# Patient Record
Sex: FEMALE | Race: WHITE | Employment: FULL TIME | URBAN - METROPOLITAN AREA
[De-identification: names, ages, dates, MRNs, and addresses within clinical notes are randomized per-mention and may not be internally consistent; named-entity substitution may affect disease eponyms.]

---

## 2019-03-24 ENCOUNTER — HOSPITAL ENCOUNTER (EMERGENCY)
Age: 29
Discharge: HOME | End: 2019-03-24
Payer: COMMERCIAL

## 2019-03-24 VITALS
DIASTOLIC BLOOD PRESSURE: 106 MMHG | SYSTOLIC BLOOD PRESSURE: 147 MMHG | BODY MASS INDEX: 43.4 KG/M2 | TEMPERATURE: 98.4 F | HEIGHT: 69 IN | WEIGHT: 293 LBS | HEART RATE: 95 BPM | RESPIRATION RATE: 14 BRPM | OXYGEN SATURATION: 97 %

## 2019-03-24 DIAGNOSIS — T19.2XXA FOREIGN BODY IN VAGINA, INITIAL ENCOUNTER: Primary | ICD-10-CM

## 2019-03-24 PROCEDURE — 99282 EMERGENCY DEPT VISIT SF MDM: CPT

## 2021-11-06 ENCOUNTER — APPOINTMENT (OUTPATIENT)
Dept: CT IMAGING | Age: 31
End: 2021-11-06

## 2021-11-06 ENCOUNTER — APPOINTMENT (OUTPATIENT)
Dept: GENERAL RADIOLOGY | Age: 31
End: 2021-11-06

## 2021-11-06 ENCOUNTER — HOSPITAL ENCOUNTER (EMERGENCY)
Age: 31
Discharge: HOME | End: 2021-11-06
Attending: EMERGENCY MEDICINE

## 2021-11-06 VITALS
DIASTOLIC BLOOD PRESSURE: 92 MMHG | WEIGHT: 260 LBS | HEART RATE: 102 BPM | TEMPERATURE: 98.2 F | OXYGEN SATURATION: 99 % | RESPIRATION RATE: 16 BRPM | BODY MASS INDEX: 37.22 KG/M2 | HEIGHT: 70 IN | SYSTOLIC BLOOD PRESSURE: 139 MMHG

## 2021-11-06 DIAGNOSIS — M79.641 RIGHT HAND PAIN: ICD-10-CM

## 2021-11-06 DIAGNOSIS — M25.531 ACUTE PAIN OF RIGHT WRIST: ICD-10-CM
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
## 2021-11-06 DIAGNOSIS — R51.9 ACUTE FACIAL PAIN: ICD-10-CM

## 2021-11-06 DIAGNOSIS — S63.601A SPRAIN OF RIGHT THUMB, UNSPECIFIED SITE OF DIGIT, INITIAL ENCOUNTER: ICD-10-CM

## 2021-11-06 LAB — HCG(URINE) PREGNANCY TEST: NEGATIVE

## 2021-11-06 PROCEDURE — 84703 CHORIONIC GONADOTROPIN ASSAY: CPT

## 2021-11-06 PROCEDURE — 29125 APPL SHORT ARM SPLINT STATIC: CPT

## 2021-11-06 PROCEDURE — 6370000000 HC RX 637 (ALT 250 FOR IP): Performed by: NURSE PRACTITIONER

## 2021-11-06 PROCEDURE — 99284 EMERGENCY DEPT VISIT MOD MDM: CPT

## 2021-11-06 PROCEDURE — 73130 X-RAY EXAM OF HAND: CPT

## 2021-11-06 PROCEDURE — 73110 X-RAY EXAM OF WRIST: CPT

## 2021-11-06 RX ORDER — HYDROCODONE BITARTRATE AND ACETAMINOPHEN 5; 325 MG/1; MG/1
1 TABLET ORAL EVERY 6 HOURS PRN
Qty: 20 TABLET | Refills: 0 | Status: SHIPPED | OUTPATIENT
Start: 2021-11-06 | End: 2021-11-09

## 2021-11-06 RX ORDER — HYDROCODONE BITARTRATE AND ACETAMINOPHEN 5; 325 MG/1; MG/1
1 TABLET ORAL ONCE
Status: COMPLETED | OUTPATIENT
Start: 2021-11-06 | End: 2021-11-06

## 2021-11-06 RX ORDER — CYCLOBENZAPRINE HCL 10 MG
10 TABLET ORAL 3 TIMES DAILY PRN
Qty: 21 TABLET | Refills: 0 | Status: SHIPPED | OUTPATIENT
Start: 2021-11-06 | End: 2021-11-16

## 2021-11-06 RX ORDER — IBUPROFEN 600 MG/1
600 TABLET ORAL EVERY 6 HOURS PRN
Qty: 30 TABLET | Refills: 0 | Status: SHIPPED | OUTPATIENT
Start: 2021-11-06 | End: 2022-07-22

## 2021-11-06 RX ORDER — IBUPROFEN 800 MG/1
800 TABLET ORAL ONCE
Status: COMPLETED | OUTPATIENT
Start: 2021-11-06 | End: 2021-11-06

## 2021-11-06 RX ADMIN — HYDROCODONE BITARTRATE AND ACETAMINOPHEN 1 TABLET: 5; 325 TABLET ORAL at 22:33

## 2021-11-06 RX ADMIN — MOXIFLOXACIN HYDROCHLORIDE 800 MG: 400 TABLET, FILM COATED ORAL at 22:33

## 2021-11-06 RX ADMIN — HYDROCODONE BITARTRATE AND ACETAMINOPHEN 1 TABLET: 5; 325 TABLET ORAL at 21:27

## 2021-11-06 ASSESSMENT — PAIN DESCRIPTION - ORIENTATION: ORIENTATION: RIGHT

## 2021-11-06 ASSESSMENT — PAIN SCALES - GENERAL
PAINLEVEL_OUTOF10: 7
PAINLEVEL_OUTOF10: 5

## 2021-11-06 ASSESSMENT — PAIN DESCRIPTION - LOCATION: LOCATION: HAND
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
## 2021-11-07 NOTE — ED NOTES
Bed: 28  Expected date:   Expected time:   Means of arrival:   Comments:   [address], RN  11/06/21 2047

## 2021-11-08 ENCOUNTER — TELEPHONE (OUTPATIENT)
Dept: ORTHOPEDIC SURGERY | Age: 31
End: 2021-11-08

## 2022-03-28 ENCOUNTER — HOSPITAL ENCOUNTER (EMERGENCY)
Age: 32
Discharge: HOME | End: 2022-03-28
Attending: EMERGENCY MEDICINE

## 2022-03-28 ENCOUNTER — APPOINTMENT (OUTPATIENT)
Dept: CT IMAGING | Age: 32
End: 2022-03-28

## 2022-03-28 VITALS
OXYGEN SATURATION: 99 % | WEIGHT: 275 LBS | HEIGHT: 70 IN | TEMPERATURE: 98.5 F | RESPIRATION RATE: 28 BRPM | BODY MASS INDEX: 39.37 KG/M2 | DIASTOLIC BLOOD PRESSURE: 90 MMHG | HEART RATE: 73 BPM | SYSTOLIC BLOOD PRESSURE: 134 MMHG

## 2022-03-28 DIAGNOSIS — R10.30 LOWER ABDOMINAL PAIN: Primary | ICD-10-CM

## 2022-03-28 LAB
A/G RATIO: 1.4 (ref 1.1–2.2)
ALBUMIN SERPL-MCNC: 4.1 G/DL (ref 3.4–5)
ALP BLD-CCNC: 81 U/L (ref 40–129)
ALT SERPL-CCNC: 17 U/L (ref 10–40)
ANION GAP SERPL CALCULATED.3IONS-SCNC: 11 MMOL/L (ref 3–16)
AST SERPL-CCNC: 25 U/L (ref 15–37)
BASOPHILS ABSOLUTE: 0 K/UL (ref 0–0.2)
BASOPHILS RELATIVE PERCENT: 0.6 %
BILIRUB SERPL-MCNC: 0.7 MG/DL (ref 0–1)
BILIRUBIN URINE: NEGATIVE
BLOOD, URINE: NEGATIVE
BUN BLDV-MCNC: 19 MG/DL (ref 7–20)
CALCIUM SERPL-MCNC: 9.1 MG/DL (ref 8.3–10.6)
CHLORIDE BLD-SCNC: 104 MMOL/L (ref 99–110)
CLARITY: CLEAR
CO2: 23 MMOL/L (ref 21–32)
COLOR: YELLOW
CREAT SERPL-MCNC: 0.8 MG/DL (ref 0.6–1.1)
EOSINOPHILS ABSOLUTE: 0.2 K/UL (ref 0–0.6)
EOSINOPHILS RELATIVE PERCENT: 2.8 %
GFR AFRICAN AMERICAN: >60
GFR NON-AFRICAN AMERICAN: >60
GLUCOSE BLD-MCNC: 104 MG/DL (ref 70–99)
GLUCOSE URINE: NEGATIVE MG/DL
HCG QUALITATIVE: NEGATIVE
HCT VFR BLD CALC: 42.7 % (ref 36–48)
HEMOGLOBIN: 13.8 G/DL (ref 12–16)
KETONES, URINE: NEGATIVE MG/DL
LEUKOCYTE ESTERASE, URINE: NEGATIVE
LIPASE: 20 U/L (ref 13–60)
LYMPHOCYTES ABSOLUTE: 1.4 K/UL (ref 1–5.1)
LYMPHOCYTES RELATIVE PERCENT: 19.5 %
MCH RBC QN AUTO: 26.2 PG (ref 26–34)
MCHC RBC AUTO-ENTMCNC: 32.3 G/DL (ref 31–36)
MCV RBC AUTO: 81.1 FL (ref 80–100)
MICROSCOPIC EXAMINATION: NORMAL
MONOCYTES ABSOLUTE: 0.5 K/UL (ref 0–1.3)
MONOCYTES RELATIVE PERCENT: 7.4 %
NEUTROPHILS ABSOLUTE: 4.9 K/UL (ref 1.7–7.7)
NEUTROPHILS RELATIVE PERCENT: 69.7 %
NITRITE, URINE: NEGATIVE
PDW BLD-RTO: 14.7 % (ref 12.4–15.4)
PH UA: 6 (ref 5–8)
PLATELET # BLD: 262 K/UL (ref 135–450)
PMV BLD AUTO: 8.4 FL (ref 5–10.5)
POTASSIUM REFLEX MAGNESIUM: 4.6 MMOL/L (ref 3.5–5.1)
PROTEIN UA: NEGATIVE MG/DL
RBC # BLD: 5.26 M/UL (ref 4–5.2)
SODIUM BLD-SCNC: 138 MMOL/L (ref 136–145)
SPECIFIC GRAVITY UA: 1.01 (ref 1–1.03)
TOTAL PROTEIN: 7 G/DL (ref 6.4–8.2)
URINE REFLEX TO CULTURE: NORMAL
URINE TYPE: NORMAL
UROBILINOGEN, URINE: 0.2 E.U./DL
WBC # BLD: 7.1 K/UL (ref 4–11)

## 2022-03-28 PROCEDURE — 96374 THER/PROPH/DIAG INJ IV PUSH: CPT

## 2022-03-28 PROCEDURE — 85025 COMPLETE CBC W/AUTO DIFF WBC: CPT

## 2022-03-28 PROCEDURE — 2580000003 HC RX 258: Performed by: EMERGENCY MEDICINE

## 2022-03-28 PROCEDURE — 99285 EMERGENCY DEPT VISIT HI MDM: CPT

## 2022-03-28 PROCEDURE — 96375 TX/PRO/DX INJ NEW DRUG ADDON: CPT

## 2022-03-28 PROCEDURE — 74177 CT ABD & PELVIS W/CONTRAST: CPT

## 2022-03-28 PROCEDURE — 84703 CHORIONIC GONADOTROPIN ASSAY: CPT

## 2022-03-28 PROCEDURE — 6360000002 HC RX W HCPCS: Performed by: EMERGENCY MEDICINE

## 2022-03-28 PROCEDURE — 80053 COMPREHEN METABOLIC PANEL: CPT

## 2022-03-28 PROCEDURE — 83690 ASSAY OF LIPASE: CPT

## 2022-03-28 PROCEDURE — 6360000004 HC RX CONTRAST MEDICATION: Performed by: EMERGENCY MEDICINE

## 2022-03-28 PROCEDURE — 81003 URINALYSIS AUTO W/O SCOPE: CPT

## 2022-03-28 RX ORDER — MORPHINE SULFATE 4 MG/ML
4 INJECTION, SOLUTION INTRAMUSCULAR; INTRAVENOUS ONCE
Status: COMPLETED | OUTPATIENT
Start: 2022-03-28 | End: 2022-03-28

## 2022-03-28 RX ORDER — ONDANSETRON 2 MG/ML
4 INJECTION INTRAMUSCULAR; INTRAVENOUS ONCE
Status: COMPLETED | OUTPATIENT
Start: 2022-03-28 | End: 2022-03-28

## 2022-03-28 RX ORDER — 0.9 % SODIUM CHLORIDE 0.9 %
1000 INTRAVENOUS SOLUTION INTRAVENOUS ONCE
Status: COMPLETED | OUTPATIENT
Start: 2022-03-28 | End: 2022-03-28

## 2022-03-28 RX ADMIN — ONDANSETRON 4 MG: 2 INJECTION INTRAMUSCULAR; INTRAVENOUS at 08:28

## 2022-03-28 RX ADMIN — SODIUM CHLORIDE 1000 ML: 9 INJECTION, SOLUTION INTRAVENOUS at 08:27

## 2022-03-28 RX ADMIN — IOPAMIDOL 75 ML: 755 INJECTION, SOLUTION INTRAVENOUS at 09:12

## 2022-03-28 RX ADMIN — MORPHINE SULFATE 4 MG: 4 INJECTION, SOLUTION INTRAMUSCULAR; INTRAVENOUS at 08:29

## 2022-03-28 ASSESSMENT — PAIN SCALES - GENERAL
PAINLEVEL_OUTOF10: 8

## 2022-03-28 ASSESSMENT — PAIN DESCRIPTION - LOCATION: LOCATION: ABDOMEN

## 2022-03-28 ASSESSMENT — PAIN - FUNCTIONAL ASSESSMENT: PAIN_FUNCTIONAL_ASSESSMENT: 0-10

## 2022-03-28 ASSESSMENT — PAIN DESCRIPTION - DESCRIPTORS: DESCRIPTORS: SHARP

## 2022-03-28 ASSESSMENT — PAIN DESCRIPTION - PAIN TYPE: TYPE: ACUTE PAIN

## 2022-03-28 NOTE — ED PROVIDER NOTES
[facility]  ED  EMERGENCY DEPARTMENT ENCOUNTER      Pt Name: Sabrina Arceo  MRN: [id number]  [place] 1990  Date of evaluation: 3/28/2022  Provider: Aren Lechuga MD    CHIEF COMPLAINT       Chief Complaint   Patient presents with    Abdominal Pain     started yesterday with right lower quad pain, came back last night accross lower abd          HISTORY OF PRESENT ILLNESS   (Location/Symptom, Timing/Onset, Context/Setting, Quality, Duration, Modifying Factors, Severity)  Note limiting factors. Sabrina Arceo is a 28 y.o. female with past medical history of remote pancreatic mass status post Whipple procedure here today for abdominal pain. Patient states last night she began to have right lower quadrant cramping and sharp abdominal pain that was fairly intermittent. She states it began again this morning much more severe and persistent and now located throughout her lower abdomen. No obvious fevers or chills but she has been nauseous and vomited. Denies any change in her bowel habits. No dysuria or hematuria. No vaginal bleeding or vaginal discharge. Pain moderate to severe without alleviating factors. \A Chronology of [place] [facility]\""    Nursing Notes were reviewed. REVIEW OF SYSTEMS    (2-9 systems for level 4, 10 or more for level 5)     Review of Systems    Please see HPI for pertinent positive and negative review of system findings. A full 10 system ROS was performed and otherwise negative.         PAST MEDICAL HISTORY     Past Medical History:   Diagnosis Date    GERD (gastroesophageal reflux disease)     Miscarriage     8/1-/12    Pancreatic mass 5/2010    followed by [facility]         SURGICAL HISTORY       Past Surgical History:   Procedure Laterality Date    DILATION AND CURETTAGE OF UTERUS      PANCREAS SURGERY  4/2010    whipple         CURRENT MEDICATIONS       Previous Medications    IBUPROFEN (ADVIL;MOTRIN) 600 MG TABLET    Take 1 tablet by mouth every 6 hours as needed for Pain       ALLERGIES     Latex    FAMILY HISTORY     History reviewed. No pertinent family history. SOCIAL HISTORY       Social History     Socioeconomic History    Marital status: Single     Spouse name: None    Number of children: None    Years of education: None    Highest education level: None   Occupational History    None   Tobacco Use    Smoking status: Former Smoker     Packs/day: 0.50     Types: Cigarettes    Smokeless tobacco: None   Substance and Sexual Activity    Alcohol use: No    Drug use: No    Sexual activity: Yes     Partners: Male   Other Topics Concern    None   Social History Narrative    None     Social Determinants of Health     Financial Resource Strain:     Difficulty of Paying Living Expenses: Not on file   Food Insecurity:     Worried About Running Out of Food in the Last Year: Not on file    Ángel of Food in the Last Year: Not on file   Transportation Needs:     Lack of Transportation (Medical): Not on file    Lack of Transportation (Non-Medical):  Not on file   Physical Activity:     Days of Exercise per Week: Not on file    Minutes of Exercise per Session: Not on file   Stress:     Feeling of Stress : Not on file   Social Connections:     Frequency of Communication with Friends and Family: Not on file    Frequency of Social Gatherings with Friends and Family: Not on file    Attends Judaism Services: Not on file    Active Member of [address] [place] [address] or Organizations: Not on file    Attends Club or Organization Meetings: Not on file    Marital Status: Not on file   Intimate Partner Violence:     Fear of Current or Ex-Partner: Not on file    Emotionally Abused: Not on file    Physically Abused: Not on file    Sexually Abused: Not on file   Housing Stability:     Unable to Pay for Housing in the Last Year: Not on file    Number of [place] in the Last Year: Not on file    Unstable Housing in the Last Year: Not on file       SCREENINGS    [place] Coma Scale  Eye Opening: Spontaneous  Best Verbal Response: Oriented  Best Motor Response: Obeys commands  [place] Coma Scale Score: 15          PHYSICAL EXAM    (up to 7 for level 4, 8 or more for level 5)     ED Triage Vitals [03/28/22 0746]   BP Temp Temp Source Pulse Resp SpO2 Height Weight   (!) 145/84 98.5 °F (36.9 °C) Oral 84 18 99 % 5' 10\" (1.778 m) 275 lb (124.7 kg)       Physical Exam    General appearance:  Cooperative. Uncomfortable appearing. Skin:  Warm. Dry. Eye:  Extraocular movements intact. Ears, nose, mouth and throat:  Oral mucosa moist,  Neck:  Trachea midline. Heart:  Regular rate and rhythm  Perfusion:  intact  Respiratory:  Lungs clear to auscultation bilaterally. Respirations nonlabored. Abdominal:   Obese abdomen that is nondistended. Chevron incision in the upper abdomen. Tenderness to palpation of the bilateral lower quadrants worse on the right with some guarding. Neurological:  Alert and oriented x 3. Moves all extremities spontaneously  Musculoskeletal:   Normal ROM, no deformities          Psychiatric:  Normal mood      DIAGNOSTIC RESULTS       Labs Reviewed   CBC WITH AUTO DIFFERENTIAL - Abnormal; Notable for the following components:       Result Value    RBC 5.26 (*)     All other components within normal limits   COMPREHENSIVE METABOLIC PANEL W/ REFLEX TO MG FOR LOW K - Abnormal; Notable for the following components:    Glucose 104 (*)     All other components within normal limits   LIPASE   HCG, SERUM, QUALITATIVE   URINALYSIS WITH REFLEX TO CULTURE       Interpretation per the Radiologist below, if obtained/available at the time of this note:    CT ABDOMEN PELVIS W IV CONTRAST Additional Contrast? None   Final Result   No acute abdominopelvic abnormality. All other labs/imaging were within normal range or not returned as of this dictation.     EMERGENCY DEPARTMENT COURSE and DIFFERENTIAL DIAGNOSIS/MDM:   Vitals:    Vitals:    03/28/22 0746 03/28/22 8786 03/28/22 0933   BP: (!) 145/84 (!) 143/84 133/76   Pulse: 84 66 63   Resp: 18 20 27   Temp: 98.5 °F (36.9 °C)     TempSrc: Oral     SpO2: 99% 98% 96%   Weight: 275 lb (124.7 kg)     Height: 5' 10\" (1.778 m)         The patient presents the emergency department today complaining of lower abdominal pain. Abdomen soft but she was tender in the lower quadrants bilaterally though worse on the right lower quadrant. No other pelvic complaints. Vital signs stable. Laboratory studies were unremarkable but given concern for possible appendicitis or even bowel obstruction given prior significant abdominal history from prior Whipple a CT scan was performed and otherwise unremarkable. She was given 1 dose of pain and nausea medications here and on reevaluation is feeling much improved. Considered possible ovarian torsion but given resolution of symptoms with no obvious large ovarian mass or cyst felt this much less likely did not feel imaging necessary. Plan to discharge home with strict return precautions    MDM    CONSULTS     None    Critical Care:   None    REASSESSMENT          PROCEDURE     Unless otherwise noted below, none     Procedures      FINAL IMPRESSION      1. Lower abdominal pain            DISPOSITION/PLAN   DISPOSITION Decision To Discharge 03/28/2022 10:08:43 AM        PATIENT REFERRED TO:  [facility]  ED  [address] [place] [address] [address] [place] [address]  [phone]    As needed      DISCHARGE MEDICATIONS:  New Prescriptions    No medications on file     Controlled Substances Monitoring:     No flowsheet data found.     (Please note that portions of this note were completed with a voice recognition program.  Efforts were made to edit the dictations but occasionally words are mis-transcribed.)    Zak Wheat MD (electronically signed)  Attending Emergency Physician            Gisel Zarate MD  03/28/22 0704

## 2022-03-29 ENCOUNTER — APPOINTMENT (OUTPATIENT)
Dept: CT IMAGING | Age: 32
End: 2022-03-29

## 2022-03-29 ENCOUNTER — HOSPITAL ENCOUNTER (EMERGENCY)
Age: 32
Discharge: HOME | End: 2022-03-30
Attending: EMERGENCY MEDICINE

## 2022-03-29 ENCOUNTER — APPOINTMENT (OUTPATIENT)
Dept: ULTRASOUND IMAGING | Age: 32
End: 2022-03-29

## 2022-03-29 VITALS
HEART RATE: 75 BPM | TEMPERATURE: 98.3 F | SYSTOLIC BLOOD PRESSURE: 133 MMHG | OXYGEN SATURATION: 100 % | RESPIRATION RATE: 18 BRPM | DIASTOLIC BLOOD PRESSURE: 86 MMHG

## 2022-03-29 DIAGNOSIS — R10.30 LOWER ABDOMINAL PAIN: Primary | ICD-10-CM

## 2022-03-29 LAB
A/G RATIO: 1.4 (ref 1.1–2.2)
ALBUMIN SERPL-MCNC: 4.9 G/DL (ref 3.4–5)
ALP BLD-CCNC: 83 U/L (ref 40–129)
ALT SERPL-CCNC: 22 U/L (ref 10–40)
ANION GAP SERPL CALCULATED.3IONS-SCNC: 13 MMOL/L (ref 3–16)
AST SERPL-CCNC: 57 U/L (ref 15–37)
BASOPHILS ABSOLUTE: 0.1 K/UL (ref 0–0.2)
BASOPHILS RELATIVE PERCENT: 0.8 %
BILIRUB SERPL-MCNC: 0.5 MG/DL (ref 0–1)
BILIRUBIN URINE: NEGATIVE
BLOOD, URINE: NEGATIVE
BUN BLDV-MCNC: 13 MG/DL (ref 7–20)
CALCIUM SERPL-MCNC: 9.4 MG/DL (ref 8.3–10.6)
CHLORIDE BLD-SCNC: 99 MMOL/L (ref 99–110)
CLARITY: CLEAR
CO2: 22 MMOL/L (ref 21–32)
COLOR: YELLOW
CREAT SERPL-MCNC: 0.8 MG/DL (ref 0.6–1.1)
EOSINOPHILS ABSOLUTE: 0.1 K/UL (ref 0–0.6)
EOSINOPHILS RELATIVE PERCENT: 1.3 %
GFR AFRICAN AMERICAN: >60
GFR NON-AFRICAN AMERICAN: >60
GLUCOSE BLD-MCNC: 74 MG/DL (ref 70–99)
GLUCOSE URINE: NEGATIVE MG/DL
GONADOTROPIN, CHORIONIC (HCG) QUANT: <5 MIU/ML
HCT VFR BLD CALC: 47.7 % (ref 36–48)
HEMOGLOBIN: 15.4 G/DL (ref 12–16)
KETONES, URINE: NEGATIVE MG/DL
LEUKOCYTE ESTERASE, URINE: NEGATIVE
LIPASE: 18 U/L (ref 13–60)
LYMPHOCYTES ABSOLUTE: 1.4 K/UL (ref 1–5.1)
LYMPHOCYTES RELATIVE PERCENT: 16.1 %
MCH RBC QN AUTO: 26.1 PG (ref 26–34)
MCHC RBC AUTO-ENTMCNC: 32.3 G/DL (ref 31–36)
MCV RBC AUTO: 80.8 FL (ref 80–100)
MICROSCOPIC EXAMINATION: NORMAL
MONOCYTES ABSOLUTE: 0.5 K/UL (ref 0–1.3)
MONOCYTES RELATIVE PERCENT: 5.4 %
NEUTROPHILS ABSOLUTE: 6.7 K/UL (ref 1.7–7.7)
NEUTROPHILS RELATIVE PERCENT: 76.4 %
NITRITE, URINE: NEGATIVE
PDW BLD-RTO: 15 % (ref 12.4–15.4)
PH UA: 6 (ref 5–8)
PLATELET # BLD: 330 K/UL (ref 135–450)
PMV BLD AUTO: 8.7 FL (ref 5–10.5)
POTASSIUM SERPL-SCNC: 5.6 MMOL/L (ref 3.5–5.1)
PROTEIN UA: NEGATIVE MG/DL
RBC # BLD: 5.9 M/UL (ref 4–5.2)
SODIUM BLD-SCNC: 134 MMOL/L (ref 136–145)
SPECIFIC GRAVITY UA: 1.02 (ref 1–1.03)
TOTAL PROTEIN: 8.4 G/DL (ref 6.4–8.2)
URINE TYPE: NORMAL
UROBILINOGEN, URINE: 0.2 E.U./DL
WBC # BLD: 8.7 K/UL (ref 4–11)

## 2022-03-29 PROCEDURE — 81003 URINALYSIS AUTO W/O SCOPE: CPT

## 2022-03-29 PROCEDURE — 84702 CHORIONIC GONADOTROPIN TEST: CPT

## 2022-03-29 PROCEDURE — 99283 EMERGENCY DEPT VISIT LOW MDM: CPT

## 2022-03-29 PROCEDURE — 6360000002 HC RX W HCPCS: Performed by: PHYSICIAN ASSISTANT

## 2022-03-29 PROCEDURE — 76830 TRANSVAGINAL US NON-OB: CPT

## 2022-03-29 PROCEDURE — 83690 ASSAY OF LIPASE: CPT

## 2022-03-29 PROCEDURE — 74177 CT ABD & PELVIS W/CONTRAST: CPT

## 2022-03-29 PROCEDURE — 80053 COMPREHEN METABOLIC PANEL: CPT

## 2022-03-29 PROCEDURE — 2580000003 HC RX 258: Performed by: PHYSICIAN ASSISTANT

## 2022-03-29 PROCEDURE — 96374 THER/PROPH/DIAG INJ IV PUSH: CPT

## 2022-03-29 PROCEDURE — 85025 COMPLETE CBC W/AUTO DIFF WBC: CPT

## 2022-03-29 PROCEDURE — 96376 TX/PRO/DX INJ SAME DRUG ADON: CPT

## 2022-03-29 PROCEDURE — 6360000004 HC RX CONTRAST MEDICATION: Performed by: PHYSICIAN ASSISTANT

## 2022-03-29 PROCEDURE — 96375 TX/PRO/DX INJ NEW DRUG ADDON: CPT

## 2022-03-29 RX ORDER — 0.9 % SODIUM CHLORIDE 0.9 %
1000 INTRAVENOUS SOLUTION INTRAVENOUS ONCE
Status: COMPLETED | OUTPATIENT
Start: 2022-03-29 | End: 2022-03-30

## 2022-03-29 RX ORDER — ONDANSETRON 2 MG/ML
4 INJECTION INTRAMUSCULAR; INTRAVENOUS ONCE
Status: COMPLETED | OUTPATIENT
Start: 2022-03-29 | End: 2022-03-29

## 2022-03-29 RX ORDER — KETOROLAC TROMETHAMINE 30 MG/ML
30 INJECTION, SOLUTION INTRAMUSCULAR; INTRAVENOUS ONCE
Status: COMPLETED | OUTPATIENT
Start: 2022-03-29 | End: 2022-03-29

## 2022-03-29 RX ORDER — MORPHINE SULFATE 4 MG/ML
4 INJECTION, SOLUTION INTRAMUSCULAR; INTRAVENOUS ONCE
Status: DISCONTINUED | OUTPATIENT
Start: 2022-03-29 | End: 2022-03-30 | Stop reason: HOSPADM

## 2022-03-29 RX ORDER — MORPHINE SULFATE 4 MG/ML
4 INJECTION, SOLUTION INTRAMUSCULAR; INTRAVENOUS ONCE
Status: COMPLETED | OUTPATIENT
Start: 2022-03-29 | End: 2022-03-29

## 2022-03-29 RX ADMIN — IOPAMIDOL 75 ML: 755 INJECTION, SOLUTION INTRAVENOUS at 21:36

## 2022-03-29 RX ADMIN — SODIUM CHLORIDE 1000 ML: 9 INJECTION, SOLUTION INTRAVENOUS at 22:54

## 2022-03-29 RX ADMIN — KETOROLAC TROMETHAMINE 30 MG: 30 INJECTION, SOLUTION INTRAMUSCULAR; INTRAVENOUS at 22:54

## 2022-03-29 RX ADMIN — MORPHINE SULFATE 4 MG: 4 INJECTION, SOLUTION INTRAMUSCULAR; INTRAVENOUS at 20:21

## 2022-03-29 RX ADMIN — ONDANSETRON 4 MG: 2 INJECTION INTRAMUSCULAR; INTRAVENOUS at 20:22

## 2022-03-29 ASSESSMENT — PAIN SCALES - GENERAL
PAINLEVEL_OUTOF10: 9
PAINLEVEL_OUTOF10: 9

## 2022-03-30 PROCEDURE — 6360000002 HC RX W HCPCS: Performed by: EMERGENCY MEDICINE

## 2022-03-30 RX ORDER — NAPROXEN 500 MG/1
500 TABLET ORAL 2 TIMES DAILY WITH MEALS
Qty: 60 TABLET | Refills: 5 | Status: SHIPPED | OUTPATIENT
Start: 2022-03-30 | End: 2022-07-22

## 2022-03-30 RX ORDER — DICYCLOMINE HYDROCHLORIDE 10 MG/1
10 CAPSULE ORAL EVERY 6 HOURS PRN
Qty: 20 CAPSULE | Refills: 0 | Status: SHIPPED | OUTPATIENT
Start: 2022-03-30 | End: 2022-07-22

## 2022-03-30 RX ORDER — ONDANSETRON 4 MG/1
4 TABLET, ORALLY DISINTEGRATING ORAL EVERY 8 HOURS PRN
Qty: 20 TABLET | Refills: 0 | Status: SHIPPED | OUTPATIENT
Start: 2022-03-30 | End: 2022-07-22

## 2022-03-30 RX ORDER — ONDANSETRON 2 MG/ML
4 INJECTION INTRAMUSCULAR; INTRAVENOUS ONCE
Status: COMPLETED | OUTPATIENT
Start: 2022-03-30 | End: 2022-03-30

## 2022-03-30 RX ADMIN — ONDANSETRON 4 MG: 2 INJECTION INTRAMUSCULAR; INTRAVENOUS at 01:11

## 2022-03-30 NOTE — ED NOTES
Discharge instructions explained. Patient verbalized understanding and denies any other concerns or complaints at this time. Patient vital signs stable and no acute signs or symptoms of distress noted at discharge. Patient deemed clinically stable. Patient d/c home with mother.      Dalton Ren RN  03/30/22 3561

## 2022-03-30 NOTE — ED PROVIDER NOTES
[facility] Emergency Department    CHIEF COMPLAINT  Abdominal Pain (was here yesterday for same thing got CT and blood work.)    SHARED SERVICE VISIT  I have seen and evaluated this patient with my supervising physician, Dr. Michelle Art. HISTORY OF PRESENT ILLNESS  Parisa Gaspar is a 28 y.o. female who presents to the ED complaining of 71 history of right lower quadrant pain. Patient brought in by family for evaluation. Patient states that she was seen and evaluated here yesterday for similar symptoms. Discharged home and reports that pain has worsened. Discomfort began on Sunday evening. Pain sharp and stabbing in nature. States that she has had nausea vomiting. No diarrhea or constipation. No black or bloody stools. She denies any chest pain or shortness of breath. No headache, lightheadedness, dizziness confusion. No urinary symptoms. 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 No abdominal surgeries otherwise. Patient denies vaginal complaints. No other complaints, modifying factors or associated symptoms. Nursing notes reviewed. Past Medical History:   Diagnosis Date    GERD (gastroesophageal reflux disease)     Miscarriage     8/1-/12    Pancreatic mass 5/2010    followed by [facility]     Past Surgical History:   Procedure Laterality Date    DILATION AND CURETTAGE OF UTERUS      PANCREAS SURGERY  4/2010    whipple     History reviewed. No pertinent family history.   Social History     Socioeconomic History    Marital status: Single     Spouse name: Not on file    Number of children: Not on file    Years of education: Not on file    Highest education level: Not on file   Occupational History    Not on file   Tobacco Use    Smoking status: Former Smoker     Packs/day: 0.50     Types: Cigarettes    Smokeless tobacco: Not on file   Substance and Sexual Activity    Alcohol use: No    Drug use: No    Sexual activity: Yes     Partners: Male   Other Topics Concern    Not on file   Social History Narrative    Not on file     Social Determinants of Health     Financial Resource Strain:     Difficulty of Paying Living Expenses: Not on file   Food Insecurity:     Worried About [address] in the Last Year: Not on file    Ángel of Food in the Last Year: Not on file   Transportation Needs:     Lack of Transportation (Medical): Not on file    Lack of Transportation (Non-Medical): Not on file   Physical Activity:     Days of Exercise per Week: Not on file    Minutes of Exercise per Session: Not on file   Stress:     Feeling of Stress : Not on file   Social Connections:     Frequency of Communication with Friends and Family: Not on file    Frequency of Social Gatherings with Friends and Family: Not on file    Attends [employer] Services: Not on file    Active Member of [address] [place] [address] or Organizations: Not on file    Attends Club or Organization Meetings: Not on file    Marital Status: Not on file   Intimate Partner Violence:     Fear of Current or Ex-Partner: Not on file    Emotionally Abused: Not on file    Physically Abused: Not on file    Sexually Abused: Not on file   Housing Stability:     Unable to Pay for Housing in the Last Year: Not on file    Number of [place] in the Last Year: Not on file    Unstable Housing in the Last Year: Not on file     No current facility-administered medications for this encounter.      Current Outpatient Medications   Medication Sig Dispense Refill    ibuprofen (ADVIL;MOTRIN) 600 MG tablet Take 1 tablet by mouth every 6 hours as needed for Pain 30 tablet 0     Allergies   Allergen Reactions    Latex Other (See Comments)     Skin irritation       REVIEW OF SYSTEMS  10 systems reviewed, pertinent positives per HPI otherwise noted to be negative    PHYSICAL EXAM  BP (!) 153/103   Pulse 112   Temp 98.3 °F (36.8 °C) (Oral)   Resp 16   LMP 03/24/2022   SpO2 100%   GENERAL APPEARANCE: Awake and alert. Cooperative. No acute distress. HEAD: Normocephalic. Atraumatic. EYES: PERRL. EOM's grossly intact. ENT: Mucous membranes are moist.   NECK: Supple. HEART: RRR. No murmurs. LUNGS: Respirations unlabored. CTAB. Good air exchange. Speaking comfortably in full sentences. ABDOMEN: Soft. Non-distended. Patient with fairly significant right lower quadrant and suprapubic pain with guarding and rebound tenderness. No rigidity. Negative Wall's. Tenderness over McBurney's point with negative Rovsing's. No fluids or ascites. No hernias or masses. Bowel sounds normal quadrants. No CVA tenderness. Tomeka Kid EXTREMITIES: No peripheral edema. Moves all extremities equally. All extremities neurovascularly intact. SKIN: Warm and dry. No acute rashes. NEUROLOGICAL: Alert and oriented. CN's 2-12 intact. No gross facial drooping. Strength 5/5, sensation intact. PSYCHIATRIC: Normal mood and affect. RADIOLOGY  CT ABDOMEN PELVIS W IV CONTRAST Additional Contrast? None    Result Date: 3/29/2022  EXAMINATION: CT OF THE ABDOMEN AND PELVIS WITH CONTRAST 3/29/2022 9:29 pm TECHNIQUE: CT of the abdomen and pelvis was performed with the administration of intravenous contrast. Multiplanar reformatted images are provided for review. Dose modulation, iterative reconstruction, and/or weight based adjustment of the mA/kV was utilized to reduce the radiation dose to as low as reasonably achievable. COMPARISON: CT abdomen and pelvis dated 03/28/2022 HISTORY: ORDERING SYSTEM PROVIDED HISTORY: RLQ pain TECHNOLOGIST PROVIDED HISTORY: Reason for exam:->RLQ pain Additional Contrast?->None Decision Support Exception - unselect if not a suspected or confirmed emergency medical condition->Emergency Medical Condition (MA) Reason for Exam: RLQ pain x 3 days pain in moving a little highe and more around towards her side. nausea and vomiting x 2 episodes.   pt was here in the ER yesterday and a a CT scan then as well.  mpt also states the she feels bloated. no BM since the pain  started 3 days ago. .  pt is drinking plenty of water but has not had much of an appetite since this started Relevant Medical/Surgical History: GERD, benign pancreatis mass with surgery. partial pancrease removal , GB removed and partial gastrectomy,,. ... FINDINGS: Lower Chest:  Visualized portion of the lower chest demonstrates no acute abnormality. Organs: The liver is unremarkable without evidence of intrahepatic biliary ductal dilation. Patient is status post Whipple. The pancreatic body, spleen, and bilateral adrenal glands are unremarkable. The kidneys enhance symmetrically without evidence of hydronephrosis. GI/Bowel: there is no evidence of bowel obstruction. No evidence of abnormal bowel wall thickening or distension. The appendix is visualized and is unremarkable. No evidence of acute appendicitis. Pelvis:  Bladder is unremarkable in appearance. The uterus and adnexa are without acute abnormality. Peritoneum/Retroperitoneum: No evidence of ascites or free air. No evidence of lymphadenopathy. Aorta is normal in caliber. Bones/Soft Tissues:  No acute abnormality of the visualized osseous structures. Visualized soft tissues are unremarkable. No acute pathology in the abdomen and pelvis. CT ABDOMEN PELVIS W IV CONTRAST Additional Contrast? None    Result Date: 3/28/2022  EXAMINATION: CT OF THE ABDOMEN AND PELVIS WITH CONTRAST 3/28/2022 9:04 am TECHNIQUE: CT of the abdomen and pelvis was performed with the administration of intravenous contrast. Multiplanar reformatted images are provided for review. Dose modulation, iterative reconstruction, and/or weight based adjustment of the mA/kV was utilized to reduce the radiation dose to as low as reasonably achievable. COMPARISON: None.  HISTORY: ORDERING SYSTEM PROVIDED HISTORY: lower abd pain TECHNOLOGIST PROVIDED HISTORY: Additional Contrast?->None Reason for exam:->lower abd pain Decision Support Exception - unselect if not a suspected or confirmed emergency medical condition->Emergency Medical Condition (MA) Reason for Exam: low abd pain, cramping, n/v Relevant Medical/Surgical History: wipple procedure age 26 FINDINGS: Lower Chest: There is no consolidation or effusion. Organs: The liver, pancreas, spleen, kidneys and adrenals are unremarkable aside from postsurgical changes of Whipple procedure and a trace amount of pneumobilia. GI/Bowel: There is no bowel dilatation, wall thickening or obstruction. The appendix is normal. Pelvis: The pelvic organs and urinary bladder are unremarkable. Peritoneum/Retroperitoneum: There is no free air. A trace amount of physiologic fluid is present within the cul-de-sac. There is no adenopathy. Bones/Soft Tissues: There is no acute fracture or aggressive osseous lesion. No acute abdominopelvic abnormality. ED COURSE/MDM/DISPOSITION  Patient received morphine and Zofran IV for pain, with good relief. Triage vitals showing slight tachycardia pulse rate 112. Patient given 1 L IV fluid bolus. CBC without leukocytosis or anemia. CMP with elevated potassium at 5.6 although hemolysis had occurred and we will obtain a redraw. Lipase normal.  Quantitative hCG was less than 5. Urinalysis without evidence for infectious process, dehydration or blood. Secondary to timing of abdominal pain and worsening symptoms do feel that repeat imaging indicated. CT abdomen pelvis again demonstrating no acute pathology. We will at this time obtain ultrasound of pelvis given ongoing pain. Additional pain medicine ordered. Please refer to Dr. Suzi Fierro documentation regarding ED course, evaluation, treatment, medical decision making, and disposition for this patient.       Results for orders placed or performed during the hospital encounter of 03/29/22   CBC with Auto Differential   Result Value Ref Range    WBC 8.7 4.0 - 11.0 K/uL    RBC 5.90 (H) 4.00 - 5.20 M/uL Hemoglobin 15.4 12.0 - 16.0 g/dL    Hematocrit 47.7 36.0 - 48.0 %    MCV 80.8 80.0 - 100.0 fL    MCH 26.1 26.0 - 34.0 pg    MCHC 32.3 31.0 - 36.0 g/dL    RDW 15.0 12.4 - 15.4 %    Platelets 382 943 - 920 K/uL    MPV 8.7 5.0 - 10.5 fL    Neutrophils % 76.4 %    Lymphocytes % 16.1 %    Monocytes % 5.4 %    Eosinophils % 1.3 %    Basophils % 0.8 %    Neutrophils Absolute 6.7 1.7 - 7.7 K/uL    Lymphocytes Absolute 1.4 1.0 - 5.1 K/uL    Monocytes Absolute 0.5 0.0 - 1.3 K/uL    Eosinophils Absolute 0.1 0.0 - 0.6 K/uL    Basophils Absolute 0.1 0.0 - 0.2 K/uL   Comprehensive Metabolic Panel   Result Value Ref Range    Sodium 134 (L) 136 - 145 mmol/L    Potassium 5.6 (H) 3.5 - 5.1 mmol/L    Chloride 99 99 - 110 mmol/L    CO2 22 21 - 32 mmol/L    Anion Gap 13 3 - 16    Glucose 74 70 - 99 mg/dL    BUN 13 7 - 20 mg/dL    CREATININE 0.8 0.6 - 1.1 mg/dL    GFR Non-African American >60 >60    GFR African American >60 >60    Calcium 9.4 8.3 - 10.6 mg/dL    Total Protein 8.4 (H) 6.4 - 8.2 g/dL    Albumin 4.9 3.4 - 5.0 g/dL    Albumin/Globulin Ratio 1.4 1.1 - 2.2    Total Bilirubin 0.5 0.0 - 1.0 mg/dL    Alkaline Phosphatase 83 40 - 129 U/L    ALT 22 10 - 40 U/L    AST 57 (H) 15 - 37 U/L   Lipase   Result Value Ref Range    Lipase 18.0 13.0 - 60.0 U/L   Urinalysis   Result Value Ref Range    Color, UA Yellow Straw/Yellow    Clarity, UA Clear Clear    Glucose, Ur Negative Negative mg/dL    Bilirubin Urine Negative Negative    Ketones, Urine Negative Negative mg/dL    Specific Gravity, UA 1.025 1.005 - 1.030    Blood, Urine Negative Negative    pH, UA 6.0 5.0 - 8.0    Protein, UA Negative Negative mg/dL    Urobilinogen, Urine 0.2 <2.0 E.U./dL    Nitrite, Urine Negative Negative    Leukocyte Esterase, Urine Negative Negative    Microscopic Examination Not Indicated     Urine Type NotGiven    hCG, quantitative, pregnancy   Result Value Ref Range    hCG Quant <5.0 <5.0 mIU/mL     Final Impression  1.  Lower abdominal pain Efrain Turpin, [address]  80/62/11 0314 No

## 2022-04-12 NOTE — ED PROVIDER NOTES
Attending Supervisory Note/Shared Visit   I have personally performed a face to face diagnostic evaluation on this patient. I have reviewed the mid-levels findings and agree. History and Exam by me shows well-appearing female no acute distress. Patient presented the ED for evaluation of lower abdominal pain. Reports that symptom onset and began the previous Sunday. She described it as a sharp pain. Patient been seen the previous day in the emergency department and work of the time was unremarkable. Patient redo due to persistent pain despite prescribed medications. Denies changes in bowel or urine function. Denies fevers. Denies a history of previous. Patient does have a previous history of Whipple procedure. Time evaluation patient's vital signs within normal limits. Respirations clear and unlabored. Patient is a regular rate and rhythm. Patient appears to be in no acute distress. Patient initially seen and evaluated by the advanced practice provider. Laboratory work-up unremarkable. No leukocytosis. LFTs within normal limits. Renal function within normal limits. Reviewed previous imaging studies. CT abdomen without any acute findings. Ultrasound was obtained to rule out  etiology of the patient's symptoms. There were no emergent findings on ultrasound. Patient will be discharged home with symptomatic treatment and GI referral.  I agree with the RIZWAN plan of care. Please RIZWAN Note for full ED course and final disposition. Disposition:  1.  Lower abdominal pain          Codi Escalante MD  Attending Emergency Physician        Codi Escalante MD  04/12/22 0959

## 2022-05-05 ENCOUNTER — OFFICE VISIT (OUTPATIENT)
Dept: PRIMARY CARE CLINIC | Age: 32
End: 2022-05-05
Payer: COMMERCIAL

## 2022-05-05 VITALS
WEIGHT: 291 LBS | HEART RATE: 78 BPM | TEMPERATURE: 97.5 F | SYSTOLIC BLOOD PRESSURE: 124 MMHG | DIASTOLIC BLOOD PRESSURE: 74 MMHG | OXYGEN SATURATION: 98 % | BODY MASS INDEX: 41.66 KG/M2 | HEIGHT: 70 IN

## 2022-05-05 DIAGNOSIS — M54.2 CHRONIC NECK PAIN: ICD-10-CM

## 2022-05-05 DIAGNOSIS — F41.9 ANXIETY: Primary | ICD-10-CM

## 2022-05-05 DIAGNOSIS — Z87.59 HX OF ONE MISCARRIAGE: ICD-10-CM

## 2022-05-05 DIAGNOSIS — G47.00 INSOMNIA, UNSPECIFIED TYPE: ICD-10-CM

## 2022-05-05 DIAGNOSIS — G89.29 CHRONIC NECK PAIN: ICD-10-CM

## 2022-05-05 DIAGNOSIS — Z90.410 HISTORY OF WHIPPLE PROCEDURE: ICD-10-CM

## 2022-05-05 DIAGNOSIS — M54.50 CHRONIC RIGHT-SIDED LOW BACK PAIN, UNSPECIFIED WHETHER SCIATICA PRESENT: ICD-10-CM

## 2022-05-05 DIAGNOSIS — G89.29 CHRONIC RIGHT-SIDED LOW BACK PAIN, UNSPECIFIED WHETHER SCIATICA PRESENT: ICD-10-CM

## 2022-05-05 DIAGNOSIS — Z90.49 HISTORY OF WHIPPLE PROCEDURE: ICD-10-CM

## 2022-05-05 DIAGNOSIS — Z76.89 ENCOUNTER TO ESTABLISH CARE: ICD-10-CM

## 2022-05-05 DIAGNOSIS — Z00.00 HEALTHCARE MAINTENANCE: ICD-10-CM

## 2022-05-05 PROCEDURE — 99204 OFFICE O/P NEW MOD 45 MIN: CPT

## 2022-05-05 RX ORDER — DULOXETIN HYDROCHLORIDE 30 MG/1
30 CAPSULE, DELAYED RELEASE ORAL DAILY
Qty: 30 CAPSULE | Refills: 5 | Status: SHIPPED | OUTPATIENT
Start: 2022-05-05 | End: 2022-07-22

## 2022-05-05 SDOH — ECONOMIC STABILITY: FOOD INSECURITY: WITHIN THE PAST 12 MONTHS, THE FOOD YOU BOUGHT JUST DIDN'T LAST AND YOU DIDN'T HAVE MONEY TO GET MORE.: NEVER TRUE

## 2022-05-05 SDOH — ECONOMIC STABILITY: FOOD INSECURITY: WITHIN THE PAST 12 MONTHS, YOU WORRIED THAT YOUR FOOD WOULD RUN OUT BEFORE YOU GOT MONEY TO BUY MORE.: NEVER TRUE

## 2022-05-05 ASSESSMENT — ENCOUNTER SYMPTOMS
VOMITING: 0
RHINORRHEA: 0
BACK PAIN: 1
COUGH: 0
WHEEZING: 0
SHORTNESS OF BREATH: 0
NAUSEA: 0
DIARRHEA: 0
SINUS PRESSURE: 0
SORE THROAT: 0
ABDOMINAL PAIN: 0

## 2022-05-05 ASSESSMENT — PATIENT HEALTH QUESTIONNAIRE - PHQ9
SUM OF ALL RESPONSES TO PHQ QUESTIONS 1-9: 19
SUM OF ALL RESPONSES TO PHQ9 QUESTIONS 1 & 2: 4
9. THOUGHTS THAT YOU WOULD BE BETTER OFF DEAD, OR OF HURTING YOURSELF: 0
4. FEELING TIRED OR HAVING LITTLE ENERGY: 3
7. TROUBLE CONCENTRATING ON THINGS, SUCH AS READING THE NEWSPAPER OR WATCHING TELEVISION: 3
3. TROUBLE FALLING OR STAYING ASLEEP: 3
SUM OF ALL RESPONSES TO PHQ QUESTIONS 1-9: 19
10. IF YOU CHECKED OFF ANY PROBLEMS, HOW DIFFICULT HAVE THESE PROBLEMS MADE IT FOR YOU TO DO YOUR WORK, TAKE CARE OF THINGS AT HOME, OR GET ALONG WITH OTHER PEOPLE: 2
6. FEELING BAD ABOUT YOURSELF - OR THAT YOU ARE A FAILURE OR HAVE LET YOURSELF OR YOUR FAMILY DOWN: 2
8. MOVING OR SPEAKING SO SLOWLY THAT OTHER PEOPLE COULD HAVE NOTICED. OR THE OPPOSITE, BEING SO FIGETY OR RESTLESS THAT YOU HAVE BEEN MOVING AROUND A LOT MORE THAN USUAL: 1
2. FEELING DOWN, DEPRESSED OR HOPELESS: 1
5. POOR APPETITE OR OVEREATING: 3
SUM OF ALL RESPONSES TO PHQ QUESTIONS 1-9: 19
SUM OF ALL RESPONSES TO PHQ QUESTIONS 1-9: 19
1. LITTLE INTEREST OR PLEASURE IN DOING THINGS: 3

## 2022-05-05 ASSESSMENT — ANXIETY QUESTIONNAIRES
IF YOU CHECKED OFF ANY PROBLEMS ON THIS QUESTIONNAIRE, HOW DIFFICULT HAVE THESE PROBLEMS MADE IT FOR YOU TO DO YOUR WORK, TAKE CARE OF THINGS AT HOME, OR GET ALONG WITH OTHER PEOPLE: VERY DIFFICULT
4. TROUBLE RELAXING: 3
7. FEELING AFRAID AS IF SOMETHING AWFUL MIGHT HAPPEN: 2
2. NOT BEING ABLE TO STOP OR CONTROL WORRYING: 3
3. WORRYING TOO MUCH ABOUT DIFFERENT THINGS: 3
GAD7 TOTAL SCORE: 19
6. BECOMING EASILY ANNOYED OR IRRITABLE: 3
5. BEING SO RESTLESS THAT IT IS HARD TO SIT STILL: 2
1. FEELING NERVOUS, ANXIOUS, OR ON EDGE: 3

## 2022-05-05 ASSESSMENT — SOCIAL DETERMINANTS OF HEALTH (SDOH): HOW HARD IS IT FOR YOU TO PAY FOR THE VERY BASICS LIKE FOOD, HOUSING, MEDICAL CARE, AND HEATING?: NOT HARD AT ALL

## 2022-05-05 NOTE — PATIENT INSTRUCTIONS
[address] [place], [place], [facility]   Phone: [phone] [address] [facility] and Administrative Office: [address] [facility],[address], [address] [facility]; [address]   [phone] for general information    [place] Office: [address] [facility] [id number];   [address] [address] [phone] for general information   [phone] for Open Access hours    [facility]. [address] [place] [address], [place], [address]  [phone], [phone] (fax)    [address], [facility] [address] [id number]  Phone: [phone]

## 2022-05-05 NOTE — LETTER
[address] [facility]  [address]  [address] [address]  Phone: [phone]  Fax: 004 Gerardo Jefferson DO        May 5, 2022     Patient: Ronald Billing   YOB: 1990   Date of Visit: 5/5/2022       To Whom it May Concern: Kathleen Jimenez was seen in my clinic on 5/5/2022. She may return to work on 05/9/22. If you have any questions or concerns, please don't hesitate to call.     Sincerely,         Ron Lynn DO
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
## 2022-07-06 ENCOUNTER — HOSPITAL ENCOUNTER (OUTPATIENT)
Dept: GENERAL RADIOLOGY | Age: 32
Discharge: HOME | End: 2022-07-06
Payer: COMMERCIAL

## 2022-07-06 ENCOUNTER — HOSPITAL ENCOUNTER (OUTPATIENT)
Age: 32
Discharge: HOME | End: 2022-07-06
Payer: COMMERCIAL

## 2022-07-06 DIAGNOSIS — G89.29 CHRONIC RIGHT-SIDED LOW BACK PAIN, UNSPECIFIED WHETHER SCIATICA PRESENT: ICD-10-CM

## 2022-07-06 DIAGNOSIS — G89.29 CHRONIC NECK PAIN: ICD-10-CM

## 2022-07-06 DIAGNOSIS — M54.50 CHRONIC RIGHT-SIDED LOW BACK PAIN, UNSPECIFIED WHETHER SCIATICA PRESENT: ICD-10-CM

## 2022-07-06 DIAGNOSIS — Z00.00 HEALTHCARE MAINTENANCE: ICD-10-CM

## 2022-07-06 DIAGNOSIS — M54.2 CHRONIC NECK PAIN: ICD-10-CM

## 2022-07-06 PROCEDURE — 83036 HEMOGLOBIN GLYCOSYLATED A1C: CPT

## 2022-07-06 PROCEDURE — 36415 COLL VENOUS BLD VENIPUNCTURE: CPT

## 2022-07-06 PROCEDURE — 72100 X-RAY EXAM L-S SPINE 2/3 VWS: CPT

## 2022-07-06 PROCEDURE — 80061 LIPID PANEL: CPT

## 2022-07-06 PROCEDURE — 72050 X-RAY EXAM NECK SPINE 4/5VWS: CPT

## 2022-07-07 LAB
CHOLESTEROL, TOTAL: 180 MG/DL (ref 0–199)
ESTIMATED AVERAGE GLUCOSE: 114 MG/DL
HBA1C MFR BLD: 5.6 %
HDLC SERPL-MCNC: 53 MG/DL (ref 40–60)
LDL CHOLESTEROL CALCULATED: 101 MG/DL
TRIGL SERPL-MCNC: 129 MG/DL (ref 0–150)
VLDLC SERPL CALC-MCNC: 26 MG/DL

## 2022-07-08 NOTE — RESULT ENCOUNTER NOTE
Left message informing patient our office will  reach out to her when the doctor receives the results

## 2022-07-22 ENCOUNTER — OFFICE VISIT (OUTPATIENT)
Dept: PRIMARY CARE CLINIC | Age: 32
End: 2022-07-22
Payer: COMMERCIAL

## 2022-07-22 VITALS
SYSTOLIC BLOOD PRESSURE: 122 MMHG | TEMPERATURE: 97.2 F | BODY MASS INDEX: 41.61 KG/M2 | WEIGHT: 290 LBS | HEART RATE: 61 BPM | DIASTOLIC BLOOD PRESSURE: 74 MMHG | OXYGEN SATURATION: 98 %

## 2022-07-22 DIAGNOSIS — M79.671 RIGHT FOOT PAIN: ICD-10-CM

## 2022-07-22 DIAGNOSIS — G47.00 INSOMNIA, UNSPECIFIED TYPE: ICD-10-CM

## 2022-07-22 DIAGNOSIS — F41.9 ANXIETY: Primary | ICD-10-CM

## 2022-07-22 DIAGNOSIS — M54.50 RIGHT LOW BACK PAIN, UNSPECIFIED CHRONICITY, UNSPECIFIED WHETHER SCIATICA PRESENT: ICD-10-CM

## 2022-07-22 DIAGNOSIS — M54.2 NECK PAIN: ICD-10-CM

## 2022-07-22 PROCEDURE — 99214 OFFICE O/P EST MOD 30 MIN: CPT

## 2022-07-22 RX ORDER — DULOXETIN HYDROCHLORIDE 60 MG/1
60 CAPSULE, DELAYED RELEASE ORAL DAILY
Qty: 30 CAPSULE | Refills: 3 | Status: SHIPPED | OUTPATIENT
Start: 2022-07-22

## 2022-07-22 ASSESSMENT — PATIENT HEALTH QUESTIONNAIRE - PHQ9
9. THOUGHTS THAT YOU WOULD BE BETTER OFF DEAD, OR OF HURTING YOURSELF: 0
10. IF YOU CHECKED OFF ANY PROBLEMS, HOW DIFFICULT HAVE THESE PROBLEMS MADE IT FOR YOU TO DO YOUR WORK, TAKE CARE OF THINGS AT HOME, OR GET ALONG WITH OTHER PEOPLE: 1
3. TROUBLE FALLING OR STAYING ASLEEP: 2
SUM OF ALL RESPONSES TO PHQ QUESTIONS 1-9: 6
4. FEELING TIRED OR HAVING LITTLE ENERGY: 1
6. FEELING BAD ABOUT YOURSELF - OR THAT YOU ARE A FAILURE OR HAVE LET YOURSELF OR YOUR FAMILY DOWN: 1
SUM OF ALL RESPONSES TO PHQ QUESTIONS 1-9: 6
5. POOR APPETITE OR OVEREATING: 1
SUM OF ALL RESPONSES TO PHQ9 QUESTIONS 1 & 2: 0
SUM OF ALL RESPONSES TO PHQ QUESTIONS 1-9: 6
7. TROUBLE CONCENTRATING ON THINGS, SUCH AS READING THE NEWSPAPER OR WATCHING TELEVISION: 1
SUM OF ALL RESPONSES TO PHQ QUESTIONS 1-9: 6
2. FEELING DOWN, DEPRESSED OR HOPELESS: 0
1. LITTLE INTEREST OR PLEASURE IN DOING THINGS: 0
8. MOVING OR SPEAKING SO SLOWLY THAT OTHER PEOPLE COULD HAVE NOTICED. OR THE OPPOSITE, BEING SO FIGETY OR RESTLESS THAT YOU HAVE BEEN MOVING AROUND A LOT MORE THAN USUAL: 0

## 2022-07-22 ASSESSMENT — ANXIETY QUESTIONNAIRES
5. BEING SO RESTLESS THAT IT IS HARD TO SIT STILL: 0
GAD7 TOTAL SCORE: 8
3. WORRYING TOO MUCH ABOUT DIFFERENT THINGS: 2
1. FEELING NERVOUS, ANXIOUS, OR ON EDGE: 1
7. FEELING AFRAID AS IF SOMETHING AWFUL MIGHT HAPPEN: 1-SEVERAL DAYS
6. BECOMING EASILY ANNOYED OR IRRITABLE: 1-SEVERAL DAYS
2. NOT BEING ABLE TO STOP OR CONTROL WORRYING: 2
4. TROUBLE RELAXING: 1
5. BEING SO RESTLESS THAT IT IS HARD TO SIT STILL: 0-NOT AT ALL
7. FEELING AFRAID AS IF SOMETHING AWFUL MIGHT HAPPEN: 1
3. WORRYING TOO MUCH ABOUT DIFFERENT THINGS: 2-OVER HALF THE DAYS
2. NOT BEING ABLE TO STOP OR CONTROL WORRYING: 2-OVER HALF THE DAYS
6. BECOMING EASILY ANNOYED OR IRRITABLE: 1
4. TROUBLE RELAXING: 1-SEVERAL DAYS
1. FEELING NERVOUS, ANXIOUS, OR ON EDGE: 1
IF YOU CHECKED OFF ANY PROBLEMS ON THIS QUESTIONNAIRE, HOW DIFFICULT HAVE THESE PROBLEMS MADE IT FOR YOU TO DO YOUR WORK, TAKE CARE OF THINGS AT HOME, OR GET ALONG WITH OTHER PEOPLE: SOMEWHAT DIFFICULT
GAD7 TOTAL SCORE: 8
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
## 2022-07-23 ASSESSMENT — ENCOUNTER SYMPTOMS
BACK PAIN: 1
RESPIRATORY NEGATIVE: 1
GASTROINTESTINAL NEGATIVE: 1

## 2022-07-25 ENCOUNTER — HOSPITAL ENCOUNTER (OUTPATIENT)
Dept: GENERAL RADIOLOGY | Age: 32
Discharge: HOME | End: 2022-07-25
Payer: COMMERCIAL

## 2022-07-25 DIAGNOSIS — M79.671 RIGHT FOOT PAIN: ICD-10-CM

## 2022-07-25 PROCEDURE — 73630 X-RAY EXAM OF FOOT: CPT

## 2022-08-21 PROBLEM — F17.201 TOBACCO DEPENDENCE IN REMISSION: Status: ACTIVE | Noted: 2022-08-21

## 2022-08-21 PROBLEM — M54.9 CHRONIC NECK AND BACK PAIN: Status: ACTIVE | Noted: 2022-07-22

## 2022-08-21 PROBLEM — G89.29 CHRONIC NECK AND BACK PAIN: Status: ACTIVE | Noted: 2022-07-22

## 2022-10-31 ENCOUNTER — TELEPHONE (OUTPATIENT)
Dept: PRIMARY CARE CLINIC | Age: 32
End: 2022-10-31

## 2022-10-31 DIAGNOSIS — F41.9 ANXIETY: Primary | ICD-10-CM

## 2022-10-31 RX ORDER — HYDROXYZINE HYDROCHLORIDE 25 MG/1
25 TABLET, FILM COATED ORAL EVERY 8 HOURS PRN
Qty: 30 TABLET | Refills: 0 | Status: SHIPPED | OUTPATIENT
Start: 2022-10-31 | End: 2022-11-08 | Stop reason: SDUPTHER

## 2022-10-31 NOTE — TELEPHONE ENCOUNTER
----- Message from Naresh Mills sent at 10/31/2022  3:02 PM EDT -----  Subject: Message to Provider    QUESTIONS  Information for Provider? Please fax letter to be sent to [employer] employer   to Fax number [phone]   ---------------------------------------------------------------------------  --------------  [id number] [employer]  [phone]; OK to leave message on voicemail  ---------------------------------------------------------------------------  --------------  SCRIPT ANSWERS  Relationship to Patient?  Self

## 2022-10-31 NOTE — TELEPHONE ENCOUNTER
----- Message from Media Rasp sent at 10/31/2022  1:13 PM EDT -----  Subject: Message to Provider    QUESTIONS  Information for Provider? pt needs to ask a few questions in regards to   her currant condition. screen green. she is dealing with alot of anxiety   and issues. please call her. URGENT.   ---------------------------------------------------------------------------  --------------  Hannah Jerez [facility]  [phone]; OK to leave message on voicemail  ---------------------------------------------------------------------------  --------------  SCRIPT ANSWERS  Relationship to Patient?  Self

## 2022-10-31 NOTE — TELEPHONE ENCOUNTER
Informed patient and she is agreeable   Patient is going to call and provide a fax number for her employer  She is requesting a letter to be faxed stating she has an upcoming appointment on 11/08/2022    Letter will be in basket at pasha's desk for faxing

## 2022-10-31 NOTE — TELEPHONE ENCOUNTER
Information for Provider? pt needs to ask a few questions in regards to   her currant condition. screen green. she is dealing with alot of anxiety   and issues. please call her. URGENT. I called the patient and spoke in detail with her  Patient is scheduled with Dr. Rebecca Gardiner on 11/08/2022  However she is current Dr. 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  Patient states she needs leave of absence paperwork filled out  I have instructed the patient at her next appointment the doctor can fill all documents out  Dr. Karina Kang previously prescribed Cymbalta 60mg patient states not much relief   Patient is wanted to know what can be done to help her at this time with anxiety and depression   Patient was crying on the phone just very overwhelmed with life conditions

## 2022-10-31 NOTE — TELEPHONE ENCOUNTER
Please let patient know that I called in hydroxyzine 25 mg for anxiety. Please inform her that it can make her quite sleepy. She can take it every 8 hours as needed.

## 2022-11-07 NOTE — PROGRESS NOTES
[address] and [facility] Medicine Residency Practice                                             [address] [facility], [address] [place] [address], [address] [place] [address]        Phone: [phone]      Name:  Reji Bellamy  :    1990    Consultants:   Patient Care Team:  Maria M Douglas DO as PCP - General (Family Medicine)  [address] [place] [address] (Pediatric Allergy & Immunology)    Chief Complaint:     Reji Bellamy is a 33 y.o. female  who presents today for an established patient care visit with Personalized Prevention Plan Services as noted below. HPI:    Reji Bellamy is a 32 yo female with hx of anxiety, insomnia, chronic neck and back pain, and BMI >40. She presents today for the following. She has concerns about anxiety. Currently taking cymbalta 60 mg and hydroxyzine 25 mg daily. Reports anxiety is at an all time high with recent miscarriage, divorce, and family member diagnosed with kidney cancer. States insomnia is worse right now as well. Her job offers counseling but she has not been seeing anyone for therapy. *** She is requesting leave of absence paperwork today. Patient Active Problem List   Diagnosis    Anxiety    Insomnia    Right low back pain    Chronic neck and back pain    Body mass index (BMI) 40.0-44.9, adult (HCC)    Tobacco dependence in remission         Past Medical History:    Past Medical History:   Diagnosis Date    Chronic neck pain     GERD (gastroesophageal reflux disease)     Lower back pain     Miscarriage     -    Pancreatic mass 2010    followed by [facility]       Past Surgical History:  Past Surgical History:   Procedure Laterality Date    DILATION AND CURETTAGE OF UTERUS      age 23-21    PANCREAS SURGERY  2010    [facility] Meds:  Prior to Visit Medications    Medication Sig Taking?  Authorizing Provider   hydrOXYzine HCl (ATARAX) 25 MG tablet Take 1 tablet by mouth every 8 hours as needed for Anxiety  Ena Rooney DO   DULoxetine (CYMBALTA) 60 MG extended release capsule Take 1 capsule by mouth in the morning. Warden Rustam DO       Allergies:    Latex and Penicillins    Family History:       Problem Relation Age of Onset    Heart Disease Paternal Grandfather          Health Maintenance Completed:  Health Maintenance   Topic Date Due    Varicella vaccine (1 of 2 - 2-dose childhood series) Never done    HIV screen  Never done    Hepatitis C screen  Never done    DTaP/Tdap/Td vaccine (1 - Tdap) Never done    Flu vaccine (1) Never done    COVID-19 Vaccine (3 - Booster for Pfizer series) 07/22/2023 (Originally 12/20/2021)    Cervical cancer screen  07/22/2023 (Originally 9/8/2012)    Depression Screen  07/22/2023    Hepatitis A vaccine  Aged Out    Hib vaccine  Aged Out    Meningococcal (ACWY) vaccine  Aged Out    Pneumococcal 0-64 years Vaccine  Aged [employer] History   Administered Date(s) Administered    COVID-19, PFIZER PURPLE top, DILUTE for use, (age 15 y+), 30mcg/0.3mL 10/04/2021, 10/25/2021         Review of Systems:  Review of Systems     Physical Exam:   There were no vitals filed for this visit. There is no height or weight on file to calculate BMI. Wt Readings from Last 3 Encounters:   07/22/22 290 lb (131.5 kg)   05/05/22 291 lb (132 kg)   03/28/22 275 lb (124.7 kg)       BP Readings from Last 3 Encounters:   07/22/22 122/74   05/05/22 124/74   03/29/22 133/86       Physical Exam         Lab Review:   No visits with results within 2 Month(s) from this visit.    Latest known visit with results is:   Hospital Outpatient Visit on 07/06/2022   Component Date Value    Hemoglobin A1C 07/06/2022 5.6     eAG 07/06/2022 114.0     Cholesterol, Total 07/06/2022 180     Triglycerides 07/06/2022 129     HDL 07/06/2022 53     LDL Calculated 07/06/2022 101 (A)     VLDL Cholesterol Calcula* 07/06/2022 26           Assessment/Plan:  There are no diagnoses linked to this encounter. Health Maintenance Due:  Health Maintenance Due   Topic Date Due    Varicella vaccine (1 of 2 - 2-dose childhood series) Never done    HIV screen  Never done    Hepatitis C screen  Never done    DTaP/Tdap/Td vaccine (1 - Tdap) Never done    Flu vaccine (1) Never done          Health care decision maker:  <72years old  Vot-ER:  {vo+ER allegra:[id number]}      Health Maintenance: (USPSTF Recommendations)  (F) Cervical Cancer Screen: (21-29 q3yr cytology alone; 30-65 q3yr cytology alone, q5yr with hrHPV alone, or q5yr cytology+hrHPV (A)): one previous pap smear, reportedly negative. Pt considering scheduling for pap smear. HIV Screen: (16-65 yr old, and all pregnant patients (A)): declined  Hep C Screen: (18-79 yr old (B)): declined  Immunizations: [place] 2nd dose done on 10/25/2021, due for booster. Never received flu vaccine. Tdap done 5-6 yrs ago after stepping on nail. RTC:  No follow-ups on file. EMR Dragon/transcription disclaimer:  Much of this encounter note is electronic transcription/translation of spoken language to printed texts. The electronic translation of spoken language may be erroneous, or at times, nonsensical words or phrases may be inadvertently transcribed.   Although I have reviewed the note for such errors, some may still exist.       Ese Willis DO, PGY-2  [address] Medicine Residency Program

## 2022-11-08 ENCOUNTER — OFFICE VISIT (OUTPATIENT)
Dept: PRIMARY CARE CLINIC | Age: 32
End: 2022-11-08
Payer: COMMERCIAL

## 2022-11-08 ENCOUNTER — HOSPITAL ENCOUNTER (OUTPATIENT)
Age: 32
Discharge: HOME | End: 2022-11-08
Payer: COMMERCIAL

## 2022-11-08 VITALS
TEMPERATURE: 97.9 F | WEIGHT: 280.8 LBS | HEIGHT: 70 IN | BODY MASS INDEX: 40.2 KG/M2 | OXYGEN SATURATION: 98 % | RESPIRATION RATE: 18 BRPM | HEART RATE: 74 BPM | SYSTOLIC BLOOD PRESSURE: 122 MMHG | DIASTOLIC BLOOD PRESSURE: 74 MMHG

## 2022-11-08 DIAGNOSIS — L65.9 HAIR LOSS: ICD-10-CM

## 2022-11-08 DIAGNOSIS — F51.04 PSYCHOPHYSIOLOGICAL INSOMNIA: ICD-10-CM

## 2022-11-08 DIAGNOSIS — R45.851 SUICIDAL IDEATION: ICD-10-CM

## 2022-11-08 DIAGNOSIS — F32.A ANXIETY AND DEPRESSION: Primary | ICD-10-CM

## 2022-11-08 DIAGNOSIS — F41.9 ANXIETY AND DEPRESSION: Primary | ICD-10-CM

## 2022-11-08 LAB — TSH REFLEX: 2.06 UIU/ML (ref 0.27–4.2)

## 2022-11-08 PROCEDURE — 99214 OFFICE O/P EST MOD 30 MIN: CPT

## 2022-11-08 PROCEDURE — 84443 ASSAY THYROID STIM HORMONE: CPT

## 2022-11-08 PROCEDURE — 36415 COLL VENOUS BLD VENIPUNCTURE: CPT

## 2022-11-08 RX ORDER — HYDROXYZINE 50 MG/1
TABLET, FILM COATED ORAL
Qty: 60 TABLET | Refills: 1 | Status: SHIPPED | OUTPATIENT
Start: 2022-11-08

## 2022-11-08 SDOH — ECONOMIC STABILITY: TRANSPORTATION INSECURITY
IN THE PAST 12 MONTHS, HAS THE LACK OF TRANSPORTATION KEPT YOU FROM MEDICAL APPOINTMENTS OR FROM GETTING MEDICATIONS?: NO

## 2022-11-08 SDOH — ECONOMIC STABILITY: TRANSPORTATION INSECURITY
IN THE PAST 12 MONTHS, HAS LACK OF TRANSPORTATION KEPT YOU FROM MEETINGS, WORK, OR FROM GETTING THINGS NEEDED FOR DAILY LIVING?: NO

## 2022-11-08 ASSESSMENT — LIFESTYLE VARIABLES
HOW OFTEN DO YOU HAVE A DRINK CONTAINING ALCOHOL: NEVER
HOW MANY STANDARD DRINKS CONTAINING ALCOHOL DO YOU HAVE ON A TYPICAL DAY: PATIENT DOES NOT DRINK

## 2022-11-08 ASSESSMENT — PATIENT HEALTH QUESTIONNAIRE - PHQ9
6. FEELING BAD ABOUT YOURSELF - OR THAT YOU ARE A FAILURE OR HAVE LET YOURSELF OR YOUR FAMILY DOWN: 3
2. FEELING DOWN, DEPRESSED OR HOPELESS: 3
SUM OF ALL RESPONSES TO PHQ QUESTIONS 1-9: 21
SUM OF ALL RESPONSES TO PHQ QUESTIONS 1-9: 20
10. IF YOU CHECKED OFF ANY PROBLEMS, HOW DIFFICULT HAVE THESE PROBLEMS MADE IT FOR YOU TO DO YOUR WORK, TAKE CARE OF THINGS AT HOME, OR GET ALONG WITH OTHER PEOPLE: 3
SUM OF ALL RESPONSES TO PHQ QUESTIONS 1-9: 21
SUM OF ALL RESPONSES TO PHQ QUESTIONS 1-9: 21
1. LITTLE INTEREST OR PLEASURE IN DOING THINGS: 3
8. MOVING OR SPEAKING SO SLOWLY THAT OTHER PEOPLE COULD HAVE NOTICED. OR THE OPPOSITE, BEING SO FIGETY OR RESTLESS THAT YOU HAVE BEEN MOVING AROUND A LOT MORE THAN USUAL: 0
5. POOR APPETITE OR OVEREATING: 2
4. FEELING TIRED OR HAVING LITTLE ENERGY: 3
SUM OF ALL RESPONSES TO PHQ9 QUESTIONS 1 & 2: 6
9. THOUGHTS THAT YOU WOULD BE BETTER OFF DEAD, OR OF HURTING YOURSELF: 1
3. TROUBLE FALLING OR STAYING ASLEEP: 3
7. TROUBLE CONCENTRATING ON THINGS, SUCH AS READING THE NEWSPAPER OR WATCHING TELEVISION: 3

## 2022-11-08 ASSESSMENT — ENCOUNTER SYMPTOMS
CONSTIPATION: 0
SORE THROAT: 0
COUGH: 0
CHEST TIGHTNESS: 0
VOMITING: 0
RHINORRHEA: 0
DIARRHEA: 0
NAUSEA: 0
SHORTNESS OF BREATH: 0

## 2022-11-08 ASSESSMENT — COLUMBIA-SUICIDE SEVERITY RATING SCALE - C-SSRS
6. HAVE YOU EVER DONE ANYTHING, STARTED TO DO ANYTHING, OR PREPARED TO DO ANYTHING TO END YOUR LIFE?: NO
3. HAVE YOU BEEN THINKING ABOUT HOW YOU MIGHT KILL YOURSELF?: YES
5. HAVE YOU STARTED TO WORK OUT OR WORKED OUT THE DETAILS OF HOW TO KILL YOURSELF? DO YOU INTEND TO CARRY OUT THIS PLAN?: NO
4. HAVE YOU HAD THESE THOUGHTS AND HAD SOME INTENTION OF ACTING ON THEM?: NO
1. WITHIN THE PAST MONTH, HAVE YOU WISHED YOU WERE DEAD OR WISHED YOU COULD GO TO SLEEP AND NOT WAKE UP?: YES
2. HAVE YOU ACTUALLY HAD ANY THOUGHTS OF KILLING YOURSELF?: YES

## 2022-11-08 ASSESSMENT — ANXIETY QUESTIONNAIRES
IF YOU CHECKED OFF ANY PROBLEMS ON THIS QUESTIONNAIRE, HOW DIFFICULT HAVE THESE PROBLEMS MADE IT FOR YOU TO DO YOUR WORK, TAKE CARE OF THINGS AT HOME, OR GET ALONG WITH OTHER PEOPLE: EXTREMELY DIFFICULT
4. TROUBLE RELAXING: 3
6. BECOMING EASILY ANNOYED OR IRRITABLE: 3
5. BEING SO RESTLESS THAT IT IS HARD TO SIT STILL: 1
GAD7 TOTAL SCORE: 17
3. WORRYING TOO MUCH ABOUT DIFFERENT THINGS: 3
2. NOT BEING ABLE TO STOP OR CONTROL WORRYING: 3
7. FEELING AFRAID AS IF SOMETHING AWFUL MIGHT HAPPEN: 1
1. FEELING NERVOUS, ANXIOUS, OR ON EDGE: 3

## 2022-11-08 NOTE — PROGRESS NOTES
[address],  Shantelle Alexander, [address] [facility] [address]        Phone: [phone]    The following is written by a medical student, please see below for resident/attending attestation and plan. Name:  Francesca Thomas  :    1990    Consultants:   Patient Care Team:  Elizabeth Ferris DO as PCP - General (Family Medicine)  [address] [place] [address] (Pediatric Allergy & Immunology)    Chief Complaint:     Francesca Thomas is a 33 y.o. female  who presents today for an established patient care visit with Personalized Prevention Plan Services as noted below. HPI:      Francesca Thomas is a 34 yo female with history of depression, anxiety, insomnia, chronic neck and back pain, and BMI >40. She presents today for worsening depression and insomnia      Patient reports that since August, she has had significant stressors including recent miscarriage, divorce, and family member diagnosed with kidney cancer. Her depression has worsened and symptoms include worsened insomnia, loss of appetite, low energy, and worsened concentration. She had one episode of SI at night where she started to google how much hydroxyzine she would need to take to not wake up. She stopped her search when thinking about her pets and family. She has had difficulty with daily tasks such as brushing her teeth and showering, staying awake and focused at work. Patient is currently on cymbalta 60mg daily and hydroxyzine 25mg prn, which she takes nightly without resolution of symptoms. She once took 50mg hydroxyzine, still without relief of insomnia. [facility] has also tried melatonin, benadryl, sleepy time teas, and practices good sleep hygiene without resolution. She is not currently seeing a therapist, but has her first visit today with one provided by her employer.  She gets 3 vaccine  Aged Out    Hib vaccine  Aged Out    Meningococcal (ACWY) vaccine  Aged Out    Pneumococcal 0-64 years Vaccine  Aged [employer] History   Administered Date(s) Administered    COVID-19, PFIZER PURPLE top, DILUTE for use, (age 15 y+), 30mcg/0.3mL 10/04/2021, 10/25/2021     Review of Systems:  Review of Systems   Constitutional:  Negative for appetite change, fatigue and fever. HENT:  Negative for congestion, rhinorrhea, sneezing and sore throat. Respiratory:  Negative for cough, chest tightness and shortness of breath. Cardiovascular:  Negative for chest pain and palpitations. Gastrointestinal:  Negative for constipation, diarrhea, nausea and vomiting. Genitourinary:  Negative for dysuria. Skin:  Negative for rash. Neurological:  Negative for headaches. Psychiatric/Behavioral:  Positive for dysphoric mood, sleep disturbance and suicidal ideas. The patient is nervous/anxious. Physical Exam:   Vitals:    11/08/22 0841   BP: 122/74   Site: Left Upper Arm   Position: Sitting   Cuff Size: Large Adult   Pulse: 74   Resp: 18   Temp: 97.9 °F (36.6 °C)   TempSrc: Infrared   SpO2: 98%   Weight: 280 lb 12.8 oz (127.4 kg)   Height: 5' 10\" (1.778 m)     Body mass index is 40.29 kg/m². Wt Readings from Last 3 Encounters:   11/08/22 280 lb 12.8 oz (127.4 kg)   07/22/22 290 lb (131.5 kg)   05/05/22 291 lb (132 kg)       BP Readings from Last 3 Encounters:   11/08/22 122/74   07/22/22 122/74   05/05/22 124/74       Physical Exam  Constitutional:       Appearance: Normal appearance. HENT:      Head: Normocephalic and atraumatic. Eyes:      Conjunctiva/sclera: Conjunctivae normal.   Cardiovascular:      Rate and Rhythm: Normal rate and regular rhythm. Heart sounds: No murmur heard. No friction rub. No gallop. Pulmonary:      Effort: Pulmonary effort is normal.      Breath sounds: Normal breath sounds. No wheezing, rhonchi or rales.    Neurological:      Mental Status: She is alert.   Psychiatric:         Mood and Affect: Affect is tearful. Behavior: Behavior normal.         Thought Content: Thought content normal. Thought content does not include homicidal plan. Judgment: Judgment normal.            Lab Review:   Hospital Outpatient Visit on 07/06/2022   Component Date Value    Hemoglobin A1C 07/06/2022 5.6     eAG 07/06/2022 114.0     Cholesterol, Total 07/06/2022 180     Triglycerides 07/06/2022 129     HDL 07/06/2022 53     LDL Calculated 07/06/2022 101 (A)     VLDL Cholesterol Calcula* 07/06/2022 26           Assessment/Plan:  Jose Ramos is a 33year old female with a history of anxiety and insomnia who presents today for worsening anxiety and insomnia with significant stressors. 1. Depression, anxiety  - uncontrolled with recent stressors  - PHQ-9: 21, ARISTEO-7: 16  - discussed plan for future SI including the suicide hotline, calling the office, calling friends. Patient also reports thoughts of her family and animals help her get through these episodes. - We will also order a TSH and Vitamin D level today to assess for underlying causes  - First therapy session today. Instructed patient to call back if her insurance does not cover therapy after three visits. - continue cymbalta 60mg. She previoiusly felt her symptoms were controlled on cymbalta. We discussed a trial of another medicine including weaning off the cymbalta and waiting 4-6 weeks to see improvement from the new medicine. Patient prefers to continue cymbalta for now. - Increase hydroxyzine to 50mg prn  - Filled out paperwork today for two week medical leave of absence for work. Patient is aware she must return to clinic if her symptoms do not improve and she requires more time. - Follow up in 3 days for reassessment     2.  Insomnia, unspecified type  - Not well controlled with recent stressors as above  - continue cymbalta 60mg and hydroxyzine 50mg prn  - Follow-up in 1 week for reassessment of insomnia    3. Hair loss  - concern for hypothyroidism worsening depression  - Obtain TSH as above  - RTC in one week for follow up      ---------------------- items not discussed at today's visit---------------------    Right foot pain  Not well controlled  DDx: Strain injury, Buck's neuroma, plantar fasciitis, fracture  Patient was ordered x-ray of foot in 3 views in order to better assess cause of pain. Patient will be further instructed on treatment methods following x-ray results  Patient was instructed to use ice and relative rest for management of pain symptoms  Use of soft surgical shoe was also recommended to patient for management of pain  - XR FOOT RIGHT (MIN 3 VIEWS); Future  Follow-up in 4 weeks for reassessment of foot pain     Right low back pain, unspecified chronicity, unspecified whether sciatica present  Not well controlled  Patient back pain is ongoing, it has been alleviated with the use of Cymbalta 30 mg. Increased dose of Cymbalta to 60 mg should provide better relief  Follow-up in 4 weeks to reassess the management of back pain     Neck pain  Not well controlled  Patient back pain is ongoing, it has been alleviated with the use of Cymbalta 30 mg.   Increased dose of Cymbalta to 60 mg should provide better relief  Follow-up in 4 weeks to reassess the management of neck pain     Body mass index (BMI) 40.0-44.9, adult (HCC)  Not at goal  -Recommend 150 minutes of cardiovascular activity a week, moderate intensity  -Increase  intake of fruits and vegetables  -Minimize packaged foods  -Follow up in 4 weeks for assessment of lifestyle changes    Health Maintenance Due:  Health Maintenance Due   Topic Date Due    Varicella vaccine (1 of 2 - 2-dose childhood series) Never done    DTaP/Tdap/Td vaccine (1 - Tdap) Never done      Health Maintenance: (USPSTF Recommendations)  NOT DISCUSSED TODAY  (F) Cervical Cancer Screen: (21-29 q3yr cytology alone; 30-65 q3yr cytology alone, q5yr with hrHPV alone, or q5yr cytology+hrHPV (A)): last pap 2/2018, repeat 2/2023  HIV Screen: (15-65 yr old, and all pregnant patients (A)): declined  Hep C Screen: (18-79 yr old (B)): declined  Immunizations: [place] 2nd dose done on 10/25/2021, due for booster. Never received flu vaccine. Tdap done 5-6 yrs ago after stepping on nail. RTC:  Return in about 3 days (around 11/11/2022) for mood check. Review TSH, increased hydroxyzine dose, therapy sessions    RESIDENT/ATTENDING ATTESTATION:    After medical student evaluation and exam, I independently gathered patients history, independently did a physical, and agree with A/P as written in medical student's note (other than clarified below). Please see below for additional information documented by the resident/attending including the A/P. Assessment/Plan:  Tobias Bernabe was seen today for anxiety.     Diagnoses and all orders for this visit:    Anxiety and depression  Suicidal Ideation   Psychophysiological insomnia  - Not well controlled at this time  - PHQ-9, ARISTEO-7 as below  - Extensive discussion was had regarding pt's recent suicidal ideation - today she denies active ideation or plan and states she does not think she could ever go through with suicide attempt  - Encouraged patient to find someone in her support system that she can discussion her anxiety/stressors, including recent suicidal ideation, with to help her feel less isolated - she is open to this  - Instructed pt to call office, therapist, 911, or suicide hotline 65 if she has suicidal thoughts/plans again - she agreed  - Prior to acute stressor events, felt well controlled on cymbalta 60 mg daily  - Discussion was hading regarding options for continuing current medication vs starting new med, pt elects to continue on current cymbalta  - Has first CBT appointment today, discussed important role of behavioral medicine in helping improve anxiety and depression - will call if insurance not covering visits after first 3 free from work and needs referral   - Discussed challenges of treating insomnia when taking antidepressant d/t concern of serotonin syndrome with many sleep aids, will increase hydroxyzine to  mg nightly - pt did affirm that she did not have any plans to attempt hydroxyzine OD and would call if she felt that was a threat  - Discussed sleep hygiene and roll of CBT in improving insomnia   - Will complete paperwork for short leave of absence from work while she gets established with counseling and works on improving sleep  - F/u in 3 days for mood check   PHQ-9 Total Score: 21 (11/8/2022  8:38 AM)  Thoughts that you would be better off dead, or of hurting yourself in some way: 1 (11/8/2022  8:38 AM)    CSSRS Last Completed:   (11/8/2022  8:38 AM)   1) Within the past month, have you wished you were dead or wished you could go to sleep and not wake up? Yes   2) Have you actually had any thoughts of killing yourself? Yes   3) Have you been thinking about how you might kill yourself? Yes   4) Have you had these thoughts and had some intention of acting on them? No   5) Have you started to work out or worked out the details of how to kill yourself? Do you intend to carry out this plan? No   6) Have you ever done anything, started to do anything, or prepared to do anything to end your life? No     ARISTEO 7 SCORE 11/8/2022 7/22/2022 5/5/2022   ARISTEO-7 Total Score 17 8 19   ARISTEO-7 Total Score - 8 -     -     hydrOXYzine HCl (ATARAX) 50 MG tablet; Take 1-2 tablets 30-60 min before bedtime    Hair loss  - Possibly d/t acute stress but will check TSH to r/o thyroid   - Hypothyroidism could be contributing to worsening depression  - Further workup/treatment based on lab results  -     TSH with Reflex; Future      EMR Dragon/transcription disclaimer:  Much of this encounter note is electronic transcription/translation of spoken language to printed texts.   The electronic translation of spoken language may be erroneous, or at times, nonsensical words or phrases may be inadvertently transcribed.   Although I have reviewed the note for such errors, some may still exist.       Favio Fleming DO, PGY-2  [address] [facility] Medicine Residency Program

## 2022-11-09 ASSESSMENT — ENCOUNTER SYMPTOMS
CONSTIPATION: 0
VOMITING: 0
SORE THROAT: 0
SHORTNESS OF BREATH: 0
RHINORRHEA: 0
CHEST TIGHTNESS: 0
DIARRHEA: 0
NAUSEA: 0
COUGH: 0

## 2022-11-10 NOTE — PROGRESS NOTES
[address],  Shantelle Alexander, [address] [facility] [place] [address]        Phone: [phone]    The following is written by a medical student, please see below for resident/attending attestation and plan. Name:  Jose Enrique Saravia  :    1990    Consultants:   Patient Care Team:  Gustabo Barber DO as PCP - General (Family Medicine)  [address] [place] [address] (Pediatric Allergy & Immunology)    Chief Complaint:     Jose Enrique Saravia is a 33 y.o. female  who presents today for an established patient care visit with Personalized Prevention Plan Services as noted below. HPI:      Jose Enrique Saravia is a 32 yo female with history of depression, anxiety, insomnia, chronic neck and back pain, and BMI >40. She presents today for mood check. Reports overall she does not feel very different from Tuesday. She did do her counseling intake visit but the counselor had a very [place] perspective on things which is not a core value for her and so she is going to try to find a better fit for her. Continues to have difficulty sleeping, mostly staying asleep, despite taking hydroxyzine, up to 100 mg. Feels the exhaustion of not getting good, continuous sleep is really contributing to how down she feels. Has not had any further suicidal thoughts or plans. Is trying to find someone in her life she can talk about all this with, but does not feel she has the right person yet. PHQ-9 Total Score: 21 (2022  9:08 AM)  Thoughts that you would be better off dead, or of hurting yourself in some way: 1 (2022  9:08 AM)    CSSRS Last Completed:   (2022  9:08 AM)  1) Within the past month, have you wished you were dead or wished you could go to sleep and not wake up? Yes    2) Have you actually had any thoughts of killing yourself?  Yes    6) Have you ever done anything, started to do anything, or prepared to do anything to end your life? No    3) Have you been thinking about how you might kill yourself? Yes    4) Have you had these thoughts and had some intention of acting on them? No    5) Have you started to work out or worked out the details of how to kill yourself? Do you intend to carry out this plan? No      ARISTEO 7 SCORE 11/11/2022 11/8/2022 7/22/2022 5/5/2022   ARISTEO-7 Total Score 19 17 8 19   ARISTEO-7 Total Score - - 8 -     Interpretation of ARISTEO-7 score: 5-9 = mild anxiety, 10-14 = moderate anxiety, 15+ = severe anxiety. Recommend referral to behavioral health for scores 10 or greater. Patient Active Problem List   Diagnosis    Anxiety    Insomnia    Right low back pain    Chronic neck and back pain    Body mass index (BMI) 40.0-44.9, adult (HCC)    Tobacco dependence in remission         Past Medical History:    Past Medical History:   Diagnosis Date    Anxiety     Chronic neck pain     GERD (gastroesophageal reflux disease)     Lower back pain     Miscarriage     8/1-/12    Pancreatic mass 05/2010    followed by [facility]       Past Surgical History:  Past Surgical History:   Procedure Laterality Date    DILATION AND CURETTAGE OF UTERUS      age 23-21    PANCREAS SURGERY  4/2010    [facility] Meds:  Prior to Visit Medications    Medication Sig Taking? Authorizing Provider   hydrOXYzine HCl (ATARAX) 50 MG tablet Take 1-2 tablets 30-60 min before bedtime Yes Sierra Thomas,    DULoxetine (CYMBALTA) 60 MG extended release capsule Take 1 capsule by mouth in the morning.  Yes Wilbert Babinski,        Allergies:    Latex and Penicillins    Family History:       Problem Relation Age of Onset    Heart Disease Paternal Grandfather          Health Maintenance Completed:  Health Maintenance   Topic Date Due    Varicella vaccine (1 of 2 - 2-dose childhood series) Never done    DTaP/Tdap/Td vaccine (1 - Tdap) Never done    COVID-19 Vaccine (3 - Booster for Chadd Peter series) 07/22/2023 (Originally 12/20/2021)    Cervical cancer screen  07/22/2023 (Originally 9/8/2012)    Hepatitis C screen  11/07/2023 (Originally 9/8/2009)    HIV screen  11/07/2023 (Originally 9/8/2006)    Flu vaccine (1) 11/08/2023 (Originally 8/1/2022)    Depression Monitoring  11/08/2023    Hepatitis A vaccine  Aged Out    Hib vaccine  Aged Out    Meningococcal (ACWY) vaccine  Aged Out    Pneumococcal 0-64 years Vaccine  Aged [employer] History   Administered Date(s) Administered    COVID-19, PFIZER PURPLE top, DILUTE for use, (age 15 y+), 30mcg/0.3mL 10/04/2021, 10/25/2021     Review of Systems:  Review of Systems   Constitutional:  Negative for appetite change, fatigue and fever. HENT:  Negative for congestion, rhinorrhea, sneezing and sore throat. Respiratory:  Negative for cough, chest tightness and shortness of breath. Cardiovascular:  Negative for chest pain and palpitations. Gastrointestinal:  Negative for constipation, diarrhea, nausea and vomiting. Genitourinary:  Negative for dysuria. Skin:  Negative for rash. Neurological:  Negative for headaches. Psychiatric/Behavioral:  Positive for dysphoric mood and sleep disturbance. Negative for suicidal ideas. The patient is nervous/anxious. Physical Exam:   Vitals:    11/11/22 0909   BP: 122/84   Site: Left Upper Arm   Position: Sitting   Cuff Size: Large Adult   Resp: 18   Temp: 97.7 °F (36.5 °C)   TempSrc: Infrared   Weight: 280 lb (127 kg)   Height: 5' 10\" (1.778 m)     Body mass index is 40.18 kg/m². Wt Readings from Last 3 Encounters:   11/11/22 280 lb (127 kg)   11/08/22 280 lb 12.8 oz (127.4 kg)   07/22/22 290 lb (131.5 kg)       BP Readings from Last 3 Encounters:   11/11/22 122/84   11/08/22 122/74   07/22/22 122/74       Physical Exam  Constitutional:       Appearance: Normal appearance. HENT:      Head: Normocephalic and atraumatic.    Eyes:      Conjunctiva/sclera: Conjunctivae normal. Cardiovascular:      Rate and Rhythm: Normal rate and regular rhythm. Heart sounds: No murmur heard. No friction rub. No gallop. Pulmonary:      Effort: Pulmonary effort is normal.      Breath sounds: Normal breath sounds. No wheezing, rhonchi or rales. Neurological:      Mental Status: She is alert. Psychiatric:         Mood and Affect: Mood is depressed. Behavior: Behavior normal.         Thought Content: Thought content normal. Thought content does not include homicidal plan. Judgment: Judgment normal.          Lab Review:   Hospital Outpatient Visit on 11/08/2022   Component Date Value    TSH 11/08/2022 2.06    Hospital Outpatient Visit on 07/06/2022   Component Date Value    Hemoglobin A1C 07/06/2022 5.6     eAG 07/06/2022 114.0     Cholesterol, Total 07/06/2022 180     Triglycerides 07/06/2022 129     HDL 07/06/2022 53     LDL Calculated 07/06/2022 101 (A)     VLDL Cholesterol Calcula* 07/06/2022 26           Assessment/Plan:  Jaleel Diop is a 33year old female with a history of anxiety and insomnia who presents today for worsening anxiety and insomnia with significant stressors. Federica was seen today for anxiety, depression and other.     Diagnoses and all orders for this visit:    Severe episode of recurrent major depressive disorder, without psychotic features ([facility] [address]  Anxiety  Passive suicidal ideations  -Not well controlled, not at goal  -Feels stable since Tuesday, denies any further suicidal ideation, intent, or plan  -Did remind patient she can always call the office, suicide hotline, [phone], or 843 if she begins to have more intense suicidal ideation again -she expressed understanding  -Will work to find a different counselor that she can relate to better  -Brexpiprazole ordered today as adjunct therapy for mood, started at 0.5 mg dosage x2 weeks and will increase to 1 mg dosage -payment assistance card given today to help cover cost  -Follow-up in 4 weeks or sooner if needed    Health Maintenance Due:  Health Maintenance Due   Topic Date Due    Varicella vaccine (1 of 2 - 2-dose childhood series) Never done    DTaP/Tdap/Td vaccine (1 - Tdap) Never done      Health Maintenance: (USPSTF Recommendations)  NOT DISCUSSED TODAY  (F) Cervical Cancer Screen: (21-29 q3yr cytology alone; 30-65 q3yr cytology alone, q5yr with hrHPV alone, or q5yr cytology+hrHPV (A)): last pap 2/2018, repeat 2/2023  HIV Screen: (15-65 yr old, and all pregnant patients (A)): declined  Hep C Screen: (18-79 yr old (B)): declined  Immunizations: [place] 2nd dose done on 10/25/2021, due for booster. Never received flu vaccine. Tdap done 5-6 yrs ago after stepping on nail. RTC:  Return in about 4 weeks (around 12/9/2022) for mood/med check. EMR Dragon/transcription disclaimer:  Much of this encounter note is electronic transcription/translation of spoken language to printed texts. The electronic translation of spoken language may be erroneous, or at times, nonsensical words or phrases may be inadvertently transcribed.   Although I have reviewed the note for such errors, some may still exist.       Devonte Davalos DO, PGY-2  [address] Medicine Residency Program

## 2022-11-11 ENCOUNTER — OFFICE VISIT (OUTPATIENT)
Dept: PRIMARY CARE CLINIC | Age: 32
End: 2022-11-11
Payer: COMMERCIAL

## 2022-11-11 VITALS
BODY MASS INDEX: 40.09 KG/M2 | DIASTOLIC BLOOD PRESSURE: 84 MMHG | HEIGHT: 70 IN | TEMPERATURE: 97.7 F | SYSTOLIC BLOOD PRESSURE: 122 MMHG | WEIGHT: 280 LBS | RESPIRATION RATE: 18 BRPM

## 2022-11-11 DIAGNOSIS — F41.9 ANXIETY: ICD-10-CM

## 2022-11-11 DIAGNOSIS — R45.851 PASSIVE SUICIDAL IDEATIONS: ICD-10-CM

## 2022-11-11 DIAGNOSIS — F33.2 SEVERE EPISODE OF RECURRENT MAJOR DEPRESSIVE DISORDER, WITHOUT PSYCHOTIC FEATURES (HCC): Primary | ICD-10-CM

## 2022-11-11 PROCEDURE — 99214 OFFICE O/P EST MOD 30 MIN: CPT

## 2022-11-11 ASSESSMENT — COLUMBIA-SUICIDE SEVERITY RATING SCALE - C-SSRS
4. HAVE YOU HAD THESE THOUGHTS AND HAD SOME INTENTION OF ACTING ON THEM?: NO
3. HAVE YOU BEEN THINKING ABOUT HOW YOU MIGHT KILL YOURSELF?: NO
1. WITHIN THE PAST MONTH, HAVE YOU WISHED YOU WERE DEAD OR WISHED YOU COULD GO TO SLEEP AND NOT WAKE UP?: YES
5. HAVE YOU STARTED TO WORK OUT OR WORKED OUT THE DETAILS OF HOW TO KILL YOURSELF? DO YOU INTEND TO CARRY OUT THIS PLAN?: NO
6. HAVE YOU EVER DONE ANYTHING, STARTED TO DO ANYTHING, OR PREPARED TO DO ANYTHING TO END YOUR LIFE?: NO
2. HAVE YOU ACTUALLY HAD ANY THOUGHTS OF KILLING YOURSELF?: YES

## 2022-11-11 ASSESSMENT — PATIENT HEALTH QUESTIONNAIRE - PHQ9
2. FEELING DOWN, DEPRESSED OR HOPELESS: 3
7. TROUBLE CONCENTRATING ON THINGS, SUCH AS READING THE NEWSPAPER OR WATCHING TELEVISION: 2
5. POOR APPETITE OR OVEREATING: 3
SUM OF ALL RESPONSES TO PHQ QUESTIONS 1-9: 21
10. IF YOU CHECKED OFF ANY PROBLEMS, HOW DIFFICULT HAVE THESE PROBLEMS MADE IT FOR YOU TO DO YOUR WORK, TAKE CARE OF THINGS AT HOME, OR GET ALONG WITH OTHER PEOPLE: 2
9. THOUGHTS THAT YOU WOULD BE BETTER OFF DEAD, OR OF HURTING YOURSELF: 1
8. MOVING OR SPEAKING SO SLOWLY THAT OTHER PEOPLE COULD HAVE NOTICED. OR THE OPPOSITE, BEING SO FIGETY OR RESTLESS THAT YOU HAVE BEEN MOVING AROUND A LOT MORE THAN USUAL: 0
SUM OF ALL RESPONSES TO PHQ9 QUESTIONS 1 & 2: 6
SUM OF ALL RESPONSES TO PHQ QUESTIONS 1-9: 20
SUM OF ALL RESPONSES TO PHQ QUESTIONS 1-9: 21
4. FEELING TIRED OR HAVING LITTLE ENERGY: 3
6. FEELING BAD ABOUT YOURSELF - OR THAT YOU ARE A FAILURE OR HAVE LET YOURSELF OR YOUR FAMILY DOWN: 3
SUM OF ALL RESPONSES TO PHQ QUESTIONS 1-9: 21
1. LITTLE INTEREST OR PLEASURE IN DOING THINGS: 3
3. TROUBLE FALLING OR STAYING ASLEEP: 3

## 2022-11-11 ASSESSMENT — ANXIETY QUESTIONNAIRES
7. FEELING AFRAID AS IF SOMETHING AWFUL MIGHT HAPPEN: 1
6. BECOMING EASILY ANNOYED OR IRRITABLE: 3
5. BEING SO RESTLESS THAT IT IS HARD TO SIT STILL: 3
4. TROUBLE RELAXING: 3
3. WORRYING TOO MUCH ABOUT DIFFERENT THINGS: 3
IF YOU CHECKED OFF ANY PROBLEMS ON THIS QUESTIONNAIRE, HOW DIFFICULT HAVE THESE PROBLEMS MADE IT FOR YOU TO DO YOUR WORK, TAKE CARE OF THINGS AT HOME, OR GET ALONG WITH OTHER PEOPLE: VERY DIFFICULT
1. FEELING NERVOUS, ANXIOUS, OR ON EDGE: 3
2. NOT BEING ABLE TO STOP OR CONTROL WORRYING: 3
GAD7 TOTAL SCORE: 19

## 2022-11-26 DIAGNOSIS — F33.2 SEVERE EPISODE OF RECURRENT MAJOR DEPRESSIVE DISORDER, WITHOUT PSYCHOTIC FEATURES (HCC): ICD-10-CM

## 2022-11-28 NOTE — TELEPHONE ENCOUNTER
Refill Request       Last Seen: Last Seen Department: 11/11/2022  Last Seen by PCP: 11/11/2022    Last Written: 11/11/2022    Next Appointment:   Future Appointments   Date Time Provider Scar Pineda   12/22/2022  3:00 PM DO Uli Solorio [address]   1/17/2023  4:00 PM Theresa Estevez DO [facility]             Requested Prescriptions     Pending Prescriptions Disp Refills    REXULTI 0.5 MG TABS tablet [Pharmacy Med Name: REXULTI 0.5 MG TABLET] 15 tablet 0     Sig: TAKE ONE TABLET BY MOUTH DAILY

## 2022-12-18 ASSESSMENT — ENCOUNTER SYMPTOMS
VOMITING: 0
RHINORRHEA: 0
SORE THROAT: 0
DIARRHEA: 0
NAUSEA: 0
SHORTNESS OF BREATH: 0
COUGH: 0
CHEST TIGHTNESS: 0
CONSTIPATION: 0

## 2022-12-18 NOTE — PROGRESS NOTES
[address],  Shantelle Alexander, [address] [facility] [address]        Phone: [phone]    The following is written by a medical student, please see below for resident/attending attestation and plan. Name:  Aguila Srivastava  :    1990    Consultants:   Patient Care Team:  Enrike Muir DO as PCP - General (Family Medicine)  [address] [place] [address] (Pediatric Allergy & Immunology)    Chief Complaint:     Aguila Srivastava is a 33 y.o. female  who presents today for an established patient care visit with Personalized Prevention Plan Services as noted below. HPI:     Aguila Srivastava is a 32 yo female with history of depression, anxiety, insomnia, chronic neck and back pain, and BMI >40. She presents today for mood and med check. At last visit, rexulti was added to daily duloxetine. She was able to get a month supply with patient assistance card and felt it made a big difference in mood. States she has been out of it for approximately 2 weeks as without the savings card and cost over thousand dollars a month. She can tell mood is worsened without it. She was not able to establish with counseling as she is getting a different job. She will start working at [employer] soon and she is very excited for this change. Continues to have difficulty in relationship with her  and this is a significant source of stress. Reports insomnia has continued and is a significant contributor to how bad she is feeling. Feels she sleeps very few hours at night usually for minutes at a time. Feels it is really impacting her mood and the way she is interacting with others. She denies any further suicidal ideation, intent, or plan.      PHQ-9 Total Score: 22 (2022  3:11 PM)  Thoughts that you would be better off dead, or of hurting yourself in some way: 0 (12/22/2022  3:11 PM)    CSSRS Last Completed:   (12/22/2022  3:11 PM)  1) Within the past month, have you wished you were dead or wished you could go to sleep and not wake up? Yes    2) Have you actually had any thoughts of killing yourself? Yes    6) Have you ever done anything, started to do anything, or prepared to do anything to end your life? No    3) Have you been thinking about how you might kill yourself? Yes    4) Have you had these thoughts and had some intention of acting on them? No    5) Have you started to work out or worked out the details of how to kill yourself? Do you intend to carry out this plan? No      ARISTEO 7 SCORE 11/11/2022 11/8/2022 7/22/2022 5/5/2022   ARISTEO-7 Total Score 19 17 8 19   ARISTEO-7 Total Score - - 8 -     Interpretation of ARISTEO-7 score: 5-9 = mild anxiety, 10-14 = moderate anxiety, 15+ = severe anxiety. Recommend referral to behavioral health for scores 10 or greater. Patient Active Problem List   Diagnosis    Anxiety    Insomnia    Right low back pain    Chronic neck and back pain    Body mass index (BMI) 40.0-44.9, adult (HCC)    Tobacco dependence in remission         Past Medical History:    Past Medical History:   Diagnosis Date    Anxiety     Chronic neck pain     GERD (gastroesophageal reflux disease)     Lower back pain     Miscarriage     8/1-/12    Pancreatic mass 05/2010    followed by [facility]       Past Surgical History:  Past Surgical History:   Procedure Laterality Date    DILATION AND CURETTAGE OF UTERUS      age [age]    PANCREAS SURGERY  4/2010    [facility] Meds:  Prior to Visit Medications    Medication Sig Taking? Authorizing Provider   brexpiprazole (REXULTI) 1 MG TABS tablet Take 1 tablet by mouth daily Yes Gay Polo, DO   doxepin (SILENOR) 3 MG TABS tablet Take 1 tablet by mouth nightly Yes Gay Polo, DO   DULoxetine (CYMBALTA) 60 MG extended release capsule Take 1 capsule by mouth in the morning.  Yes Jaime Gale DO       Allergies:    Latex and Penicillins    Family History:       Problem Relation Age of Onset    Heart Disease Paternal Grandfather          Health Maintenance Completed:  Health Maintenance   Topic Date Due    Varicella vaccine (1 of 2 - 2-dose childhood series) Never done    DTaP/Tdap/Td vaccine (1 - Tdap) Never done    COVID-19 Vaccine (3 - Booster for Pfizer series) 07/22/2023 (Originally 12/20/2021)    Cervical cancer screen  07/22/2023 (Originally 9/8/2012)    Hepatitis C screen  11/07/2023 (Originally 9/8/2009)    HIV screen  11/07/2023 (Originally 9/8/2006)    Flu vaccine (1) 11/08/2023 (Originally 8/1/2022)    Depression Monitoring  11/11/2023    Hepatitis A vaccine  Aged Out    Hib vaccine  Aged Out    Meningococcal (ACWY) vaccine  Aged Out    Pneumococcal 0-64 years Vaccine  Aged Jani Brothers History   Administered Date(s) Administered    COVID-19, PFIZER PURPLE top, DILUTE for use, (age 15 y+), 30mcg/0.3mL 10/04/2021, 10/25/2021     Review of Systems:  Review of Systems   Constitutional:  Negative for appetite change, fatigue and fever. HENT:  Negative for congestion, rhinorrhea, sneezing and sore throat. Respiratory:  Negative for cough, chest tightness and shortness of breath. Cardiovascular:  Negative for chest pain and palpitations. Gastrointestinal:  Negative for constipation, diarrhea, nausea and vomiting. Genitourinary:  Negative for dysuria. Skin:  Negative for rash. Neurological:  Negative for headaches. Psychiatric/Behavioral:  Positive for dysphoric mood and sleep disturbance. Negative for suicidal ideas. The patient is nervous/anxious. Physical Exam:   Vitals:    12/22/22 1512   BP: 116/72   Site: Left Upper Arm   Position: Sitting   Cuff Size: Large Adult   Pulse: 69   Resp: 18   Temp: 98.5 °F (36.9 °C)   TempSrc: Temporal   SpO2: 96%   Weight: 288 lb (130.6 kg)   Height: 5' 10\" (1.778 m)     Body mass index is 41.32 kg/m².     Wt Readings from Last 3 Encounters:   12/22/22 288 lb (130.6 kg)   11/11/22 280 lb (127 kg)   11/08/22 280 lb 12.8 oz (127.4 kg)       BP Readings from Last 3 Encounters:   12/22/22 116/72   11/11/22 122/84   11/08/22 122/74       Physical Exam  Constitutional:       Appearance: Normal appearance. HENT:      Head: Normocephalic and atraumatic. Eyes:      Conjunctiva/sclera: Conjunctivae normal.   Cardiovascular:      Rate and Rhythm: Normal rate and regular rhythm. Heart sounds: No murmur heard. No friction rub. No gallop. Pulmonary:      Effort: Pulmonary effort is normal.      Breath sounds: Normal breath sounds. No wheezing, rhonchi or rales. Neurological:      Mental Status: She is alert. Psychiatric:         Mood and Affect: Mood and affect normal.         Behavior: Behavior normal.         Thought Content: Thought content normal. Thought content does not include homicidal plan. Judgment: Judgment normal.          Lab Review:   Hospital Outpatient Visit on 11/08/2022   Component Date Value    TSH 11/08/2022 2.06    Hospital Outpatient Visit on 07/06/2022   Component Date Value    Hemoglobin A1C 07/06/2022 5.6     eAG 07/06/2022 114.0     Cholesterol, Total 07/06/2022 180     Triglycerides 07/06/2022 129     HDL 07/06/2022 53     LDL Calculated 07/06/2022 101 (A)     VLDL Cholesterol Calcula* 07/06/2022 26           Assessment/Plan:  Haylee Kwon is a 33year old female with a history of anxiety and insomnia who presents today for worsening anxiety and insomnia with significant stressors. Ander Lombard was seen today for anxiety, depression and other.     Severe episode of recurrent major depressive disorder, without psychotic features ([facility] [address]  Anxiety  Psychosocial insomnia  -Not well controlled, not at goal  -Continue duloxetine 60 mg and rexulti 1 mg daily  -New Rexulti savings card given today, instructed patient to pursue patient assistance programs via medication website if savings card is not working and reach out to office if any difficulty  -Reviewed risks and benefits of adding doxepin and risk of serotonin syndrome, patient would like to proceed with medication -signs and symptoms of serotonin syndrome reviewed   -Start doxepin 3 mg nightly for insomnia  -External referral to behavioral health and behavioral medicine packet given to patient  -Follow-up in 1 month    Health Maintenance Due:  Health Maintenance Due   Topic Date Due    Varicella vaccine (1 of 2 - 2-dose childhood series) Never done    DTaP/Tdap/Td vaccine (1 - Tdap) Never done      Health Maintenance: (USPSTF Recommendations)  NOT DISCUSSED TODAY  (F) Cervical Cancer Screen: (21-29 q3yr cytology alone; 30-65 q3yr cytology alone, q5yr with hrHPV alone, or q5yr cytology+hrHPV (A)): last pap 2/2018, repeat 2/2023  HIV Screen: (15-65 yr old, and all pregnant patients (A)): declined  Hep C Screen: (18-79 yr old (B)): declined  Immunizations: Elyse 2nd dose done on 10/25/2021, due for booster. Never received flu vaccine. Tdap done 5-6 yrs ago after stepping on nail. RTC:  Return in about 4 weeks (around 1/19/2023) for med check. EMR Dragon/transcription disclaimer:  Much of this encounter note is electronic transcription/translation of spoken language to printed texts. The electronic translation of spoken language may be erroneous, or at times, nonsensical words or phrases may be inadvertently transcribed.   Although I have reviewed the note for such errors, some may still exist.       Addie Doyle DO, PGY-2  [address] [facility] Niacinamide Pregnancy And Lactation Text: These medications are considered safe during pregnancy.

## 2022-12-22 ENCOUNTER — TELEPHONE (OUTPATIENT)
Dept: PRIMARY CARE CLINIC | Age: 32
End: 2022-12-22

## 2022-12-22 ENCOUNTER — OFFICE VISIT (OUTPATIENT)
Dept: PRIMARY CARE CLINIC | Age: 32
End: 2022-12-22

## 2022-12-22 VITALS
HEIGHT: 70 IN | WEIGHT: 288 LBS | DIASTOLIC BLOOD PRESSURE: 72 MMHG | BODY MASS INDEX: 41.23 KG/M2 | SYSTOLIC BLOOD PRESSURE: 116 MMHG | TEMPERATURE: 98.5 F | OXYGEN SATURATION: 96 % | HEART RATE: 69 BPM | RESPIRATION RATE: 18 BRPM

## 2022-12-22 DIAGNOSIS — F41.9 ANXIETY: Primary | ICD-10-CM

## 2022-12-22 DIAGNOSIS — F33.2 SEVERE EPISODE OF RECURRENT MAJOR DEPRESSIVE DISORDER, WITHOUT PSYCHOTIC FEATURES (HCC): ICD-10-CM

## 2022-12-22 DIAGNOSIS — F51.04 PSYCHOPHYSIOLOGICAL INSOMNIA: ICD-10-CM

## 2022-12-22 RX ORDER — DOXEPIN HYDROCHLORIDE 3 MG/1
3 TABLET ORAL NIGHTLY
Qty: 30 TABLET | Refills: 1 | Status: SHIPPED | OUTPATIENT
Start: 2022-12-22

## 2022-12-22 ASSESSMENT — PATIENT HEALTH QUESTIONNAIRE - PHQ9
2. FEELING DOWN, DEPRESSED OR HOPELESS: 3
4. FEELING TIRED OR HAVING LITTLE ENERGY: 3
SUM OF ALL RESPONSES TO PHQ QUESTIONS 1-9: 22
3. TROUBLE FALLING OR STAYING ASLEEP: 3
10. IF YOU CHECKED OFF ANY PROBLEMS, HOW DIFFICULT HAVE THESE PROBLEMS MADE IT FOR YOU TO DO YOUR WORK, TAKE CARE OF THINGS AT HOME, OR GET ALONG WITH OTHER PEOPLE: 2
SUM OF ALL RESPONSES TO PHQ QUESTIONS 1-9: 22
9. THOUGHTS THAT YOU WOULD BE BETTER OFF DEAD, OR OF HURTING YOURSELF: 0
1. LITTLE INTEREST OR PLEASURE IN DOING THINGS: 3
6. FEELING BAD ABOUT YOURSELF - OR THAT YOU ARE A FAILURE OR HAVE LET YOURSELF OR YOUR FAMILY DOWN: 3
8. MOVING OR SPEAKING SO SLOWLY THAT OTHER PEOPLE COULD HAVE NOTICED. OR THE OPPOSITE, BEING SO FIGETY OR RESTLESS THAT YOU HAVE BEEN MOVING AROUND A LOT MORE THAN USUAL: 3
5. POOR APPETITE OR OVEREATING: 3
7. TROUBLE CONCENTRATING ON THINGS, SUCH AS READING THE NEWSPAPER OR WATCHING TELEVISION: 1
SUM OF ALL RESPONSES TO PHQ QUESTIONS 1-9: 22
SUM OF ALL RESPONSES TO PHQ QUESTIONS 1-9: 22
SUM OF ALL RESPONSES TO PHQ9 QUESTIONS 1 & 2: 6

## 2022-12-22 ASSESSMENT — ANXIETY QUESTIONNAIRES
6. BECOMING EASILY ANNOYED OR IRRITABLE: 0
5. BEING SO RESTLESS THAT IT IS HARD TO SIT STILL: 3
1. FEELING NERVOUS, ANXIOUS, OR ON EDGE: 3
2. NOT BEING ABLE TO STOP OR CONTROL WORRYING: 2
3. WORRYING TOO MUCH ABOUT DIFFERENT THINGS: 2
4. TROUBLE RELAXING: 3
IF YOU CHECKED OFF ANY PROBLEMS ON THIS QUESTIONNAIRE, HOW DIFFICULT HAVE THESE PROBLEMS MADE IT FOR YOU TO DO YOUR WORK, TAKE CARE OF THINGS AT HOME, OR GET ALONG WITH OTHER PEOPLE: VERY DIFFICULT

## 2022-12-22 ASSESSMENT — COLUMBIA-SUICIDE SEVERITY RATING SCALE - C-SSRS
6. HAVE YOU EVER DONE ANYTHING, STARTED TO DO ANYTHING, OR PREPARED TO DO ANYTHING TO END YOUR LIFE?: NO
2. HAVE YOU ACTUALLY HAD ANY THOUGHTS OF KILLING YOURSELF?: NO
1. WITHIN THE PAST MONTH, HAVE YOU WISHED YOU WERE DEAD OR WISHED YOU COULD GO TO SLEEP AND NOT WAKE UP?: NO

## 2022-12-22 NOTE — TELEPHONE ENCOUNTER
Received a fax from [facility] stating [address] [facility] needs PA. I scanned in paperwork from [facility] and sent to PA dept.

## 2022-12-23 NOTE — TELEPHONE ENCOUNTER
Insurance has been verified and is covered for pharmacy coverage until 1/3/2023. Please see insurance coverage.

## 2022-12-27 ENCOUNTER — TELEPHONE (OUTPATIENT)
Dept: ADMINISTRATIVE | Age: 32
End: 2022-12-27

## 2023-01-04 DIAGNOSIS — F41.9 ANXIETY: ICD-10-CM

## 2023-01-05 RX ORDER — DULOXETIN HYDROCHLORIDE 60 MG/1
CAPSULE, DELAYED RELEASE ORAL
Qty: 30 CAPSULE | Refills: 3 | Status: SHIPPED | OUTPATIENT
Start: 2023-01-05

## 2023-01-05 NOTE — TELEPHONE ENCOUNTER
Refill Request - Controlled Substance      Last Seen Department: 12/22/2022  Last Seen by PCP: 7/22/2022    Last Written: 7/22/22    Last UDS: n/a    Med Agreement Signed On: n/a    Next Appointment:   Future Appointments   Date Time Provider Scar Pineda   1/19/2023 10:00 AM Khang Lovelace DO [facility] AND RES [facility]         Future appointment scheduled    Requested Prescriptions     Pending Prescriptions Disp Refills    DULoxetine (CYMBALTA) 60 MG extended release capsule [Pharmacy Med Name: DULOXETINE HCL DR 60 MG CAPSULE] 30 capsule 3     Sig: TAKE ONE CAPSULE BY MOUTH EVERY MORNING

## 2023-02-04 DIAGNOSIS — F51.04 PSYCHOPHYSIOLOGICAL INSOMNIA: ICD-10-CM

## 2023-02-06 NOTE — TELEPHONE ENCOUNTER
Refill Request - Sent message to [employer] pt was a NO show on 1/19/2023. Needs an appointment       Name from pharmacy: HYDROXYZINE HCL 50 MG TABLET          Will file in chart as: hydrOXYzine HCl (ATARAX) 50 MG tablet    The original prescription was discontinued on 12/22/2022 by John Alcantara DO for the following reason: Alternate therapy. Renewing this prescription may not be appropriate. Sig: TAKE 1-2 TABLETS BY MOUTH 30-60 MINUTES BEFORE BEDTIME    Disp:  60 tablet    Refills:  1 (Pharmacy requested: Not specified)    Start: 2/4/2023    Class: Normal    Non-formulary For: Psychophysiological insomnia    Last ordered: 3 months ago by John Alcantara DO Last refill: 12/22/2022    Rx #: [id number]         Last Seen: Last Seen Department: 12/22/2022  Last Seen by PCP: 12/22/2022    Last Written: 11/8/2022    Sent message to [employer]     Next Appointment:   No future appointments.           Requested Prescriptions     Pending Prescriptions Disp Refills    hydrOXYzine HCl (ATARAX) 50 MG tablet [Pharmacy Med Name: HYDROXYZINE HCL 50 MG TABLET] 60 tablet 1     Sig: TAKE 1-2 TABLETS BY MOUTH 30-60 MINUTES BEFORE BEDTIME

## 2023-02-15 RX ORDER — HYDROXYZINE 50 MG/1
TABLET, FILM COATED ORAL
Qty: 60 TABLET | Refills: 1 | OUTPATIENT
Start: 2023-02-15

## 2023-03-27 ENCOUNTER — HOSPITAL ENCOUNTER (EMERGENCY)
Age: 33
Discharge: HOME | End: 2023-03-27

## 2023-03-27 ENCOUNTER — APPOINTMENT (OUTPATIENT)
Dept: GENERAL RADIOLOGY | Age: 33
End: 2023-03-27

## 2023-03-27 VITALS
TEMPERATURE: 98.6 F | SYSTOLIC BLOOD PRESSURE: 131 MMHG | HEIGHT: 70 IN | HEART RATE: 95 BPM | BODY MASS INDEX: 38.65 KG/M2 | OXYGEN SATURATION: 99 % | DIASTOLIC BLOOD PRESSURE: 85 MMHG | RESPIRATION RATE: 18 BRPM | WEIGHT: 270 LBS

## 2023-03-27 DIAGNOSIS — S63.501A SPRAIN OF RIGHT WRIST, INITIAL ENCOUNTER: Primary | ICD-10-CM

## 2023-03-27 PROCEDURE — 73110 X-RAY EXAM OF WRIST: CPT

## 2023-03-27 PROCEDURE — 99283 EMERGENCY DEPT VISIT LOW MDM: CPT

## 2023-03-27 PROCEDURE — 73090 X-RAY EXAM OF FOREARM: CPT

## 2023-03-27 RX ORDER — DICLOFENAC SODIUM 75 MG/1
75 TABLET, DELAYED RELEASE ORAL 2 TIMES DAILY
Qty: 14 TABLET | Refills: 0 | Status: SHIPPED | OUTPATIENT
Start: 2023-03-27 | End: 2023-04-03

## 2023-03-27 ASSESSMENT — PAIN - FUNCTIONAL ASSESSMENT: PAIN_FUNCTIONAL_ASSESSMENT: 0-10

## 2023-03-27 ASSESSMENT — PAIN DESCRIPTION - ORIENTATION: ORIENTATION: RIGHT

## 2023-03-27 ASSESSMENT — PAIN DESCRIPTION - DESCRIPTORS: DESCRIPTORS: ACHING

## 2023-03-27 ASSESSMENT — PAIN SCALES - GENERAL: PAINLEVEL_OUTOF10: 10

## 2023-03-27 ASSESSMENT — PAIN DESCRIPTION - LOCATION: LOCATION: WRIST

## 2023-03-29 NOTE — ED PROVIDER NOTES
MEDICATIONS:  Discharge Medication List as of 3/27/2023  9:46 PM                 (Please note that portions of this note were completed with a voice recognition program.  Efforts were made to edit the dictations but occasionally words are mis-transcribed.)    STARLA Connor CNP (electronically signed)       STARLA Connor CNP  03/28/23 [id number]

## 2023-04-12 PROBLEM — F33.2 SEVERE EPISODE OF RECURRENT MAJOR DEPRESSIVE DISORDER, WITHOUT PSYCHOTIC FEATURES (HCC): Status: ACTIVE | Noted: 2023-04-12

## 2023-05-07 DIAGNOSIS — F51.04 PSYCHOPHYSIOLOGICAL INSOMNIA: ICD-10-CM

## 2023-05-08 NOTE — TELEPHONE ENCOUNTER
Refill Request     Last Seen: 4/13/2023    Last Written: 4/13/23      Next Appointment:   No future appointments.           Requested Prescriptions     Pending Prescriptions Disp Refills    traZODone (DESYREL) 50 MG tablet [Pharmacy Med Name: traZODone 50 MG TABLET] 30 tablet 0     Sig: TAKE ONE TABLET BY MOUTH ONCE NIGHTLY AS NEEDED FOR SLEEP

## 2023-05-09 ENCOUNTER — TELEPHONE (OUTPATIENT)
Dept: ADMINISTRATIVE | Age: 33
End: 2023-05-09

## 2023-05-09 DIAGNOSIS — F41.9 ANXIETY: Primary | ICD-10-CM

## 2023-05-10 RX ORDER — TRAZODONE HYDROCHLORIDE 50 MG/1
TABLET ORAL
Qty: 30 TABLET | Refills: 0 | Status: SHIPPED | OUTPATIENT
Start: 2023-05-10

## 2023-05-22 DIAGNOSIS — F51.04 PSYCHOPHYSIOLOGICAL INSOMNIA: ICD-10-CM

## 2023-05-23 RX ORDER — TRAZODONE HYDROCHLORIDE 50 MG/1
50 TABLET ORAL NIGHTLY PRN
Qty: 30 TABLET | Refills: 0 | OUTPATIENT
Start: 2023-05-23

## 2023-05-30 RX ORDER — TRAZODONE HYDROCHLORIDE 100 MG/1
100 TABLET ORAL NIGHTLY PRN
Qty: 30 TABLET | Refills: 2 | Status: SHIPPED | OUTPATIENT
Start: 2023-05-30

## 2023-08-13 DIAGNOSIS — F51.04 PSYCHOPHYSIOLOGICAL INSOMNIA: ICD-10-CM

## 2023-08-14 RX ORDER — TRAZODONE HYDROCHLORIDE 100 MG/1
TABLET ORAL
Qty: 30 TABLET | Refills: 0 | Status: SHIPPED | OUTPATIENT
Start: 2023-08-14

## 2023-08-14 NOTE — TELEPHONE ENCOUNTER
Refill Request       Last Seen: Last Seen Department: 4/13/2023  Last Seen by PCP: 4/13/2023    Last Written: 05/30/23    Next Appointment:   No future appointments. Message to [address] to schedule appointment.          Requested Prescriptions     Pending Prescriptions Disp Refills    traZODone (DESYREL) 100 MG tablet [Pharmacy Med Name: traZODone 100 MG TABLET] 30 tablet 2     Sig: TAKE 1 TABLET BY MOUTH DAILY NIGHTLY AS NEEDED FOR SLEEP

## 2023-08-14 NOTE — TELEPHONE ENCOUNTER
Patient is overdue for appointment. A 30 day supply was sent to pharmacy. Patient needs appointment to receive more. Please call to schedule appointment within the next 30 days.

## 2023-08-18 DIAGNOSIS — F41.9 ANXIETY: ICD-10-CM

## 2023-08-21 RX ORDER — DULOXETIN HYDROCHLORIDE 60 MG/1
CAPSULE, DELAYED RELEASE ORAL
Qty: 30 CAPSULE | Refills: 3 | Status: SHIPPED | OUTPATIENT
Start: 2023-08-21

## 2023-08-21 NOTE — TELEPHONE ENCOUNTER
Refill Request   Return in about 6 months (around 10/13/2023) for chronic care    Last Seen: Last Seen Department: 4/13/2023  Last Seen by PCP: 4/13/2023    Last Written: 4/13/2023 30 with 3    Next Appointment:   Future Appointments   Date Time Provider [address]   8/21/2023  4:00 PM Collins Soares DO [facility] AND RES [facility]         Requested Prescriptions     Pending Prescriptions Disp Refills    DULoxetine (CYMBALTA) 60 MG extended release capsule [Pharmacy Med Name: DULoxetine HCL DR 60 MG CAPSULE] 30 capsule 3     Sig: TAKE ONE CAPSULE BY MOUTH EVERY MORNING

## 2023-08-23 DIAGNOSIS — F41.9 ANXIETY: ICD-10-CM

## 2023-08-23 NOTE — TELEPHONE ENCOUNTER
Refill Request   Return in about 6 months (around 10/13/2023) for chronic care. Last Seen: Last Seen Department: 4/13/2023  Last Seen by PCP: 4/13/2023    Last Written: 8/21/2023 30 with 3    Next Appointment:   No future appointments. Message to [employer] to schedule appointment.          Requested Prescriptions     Pending Prescriptions Disp Refills    DULoxetine (CYMBALTA) 60 MG extended release capsule 30 capsule 3     Sig: Take 1 capsule by mouth every morning

## 2023-08-24 RX ORDER — DULOXETIN HYDROCHLORIDE 60 MG/1
60 CAPSULE, DELAYED RELEASE ORAL EVERY MORNING
Qty: 30 CAPSULE | Refills: 3 | OUTPATIENT
Start: 2023-08-24

## 2023-09-23 DIAGNOSIS — F51.04 PSYCHOPHYSIOLOGICAL INSOMNIA: ICD-10-CM

## 2023-09-25 RX ORDER — TRAZODONE HYDROCHLORIDE 100 MG/1
TABLET ORAL
Qty: 30 TABLET | Refills: 2 | Status: SHIPPED | OUTPATIENT
Start: 2023-09-25

## 2023-09-25 NOTE — TELEPHONE ENCOUNTER
Refill Request       Last Seen: Last Seen Department: 4/13/2023  Last Seen by PCP: 4/13/2023    Last Written: 08/14/23 qty 30    Next Appointment:   Future Appointments   Date Time Provider [facility] [address]   10/16/2023  3:30 PM Scooter Valero DO [facility] AND RES [facility]       Future appointment scheduled      Requested Prescriptions     Pending Prescriptions Disp Refills    traZODone (DESYREL) 100 MG tablet [Pharmacy Med Name: traZODone 100 MG TABLET] 30 tablet 0     Sig: TAKE 1 TABLET BY MOUTH DAILY AS NEEDED FOR SLEEP

## 2023-09-30 LAB — PAP SMEAR, EXTERNAL: NEGATIVE

## 2023-10-30 ENCOUNTER — TELEPHONE (OUTPATIENT)
Dept: PRIMARY CARE CLINIC | Age: 33
End: 2023-10-30

## 2023-10-30 NOTE — TELEPHONE ENCOUNTER
Pt missed appointment with Dr. Patrice Butts at 9:00 for medication refills. Called Pt and left voicemail for call back.

## 2023-12-22 DIAGNOSIS — F51.04 PSYCHOPHYSIOLOGICAL INSOMNIA: ICD-10-CM

## 2023-12-22 RX ORDER — TRAZODONE HYDROCHLORIDE 100 MG/1
TABLET ORAL
Qty: 30 TABLET | Refills: 0 | Status: SHIPPED | OUTPATIENT
Start: 2023-12-22

## 2023-12-22 NOTE — TELEPHONE ENCOUNTER
Refill Request       Return in about 6 months (around 10/13/2023) for chronic care. No showed last appt     Last Seen: Last Seen Department: 4/13/2023  Last Seen by PCP: 4/13/2023    Last Written: 9/25/23 qty 30 w/ 2    Next Appointment:   No future appointments. Message to [employer] to schedule appointment.          Requested Prescriptions     Pending Prescriptions Disp Refills    traZODone (DESYREL) 100 MG tablet [Pharmacy Med Name: traZODone 100 MG TABLET] 30 tablet 2     Sig: TAKE 1 TABLET BY MOUTH DAILY AS NEEDED FOR SLEEP

## 2024-01-29 DIAGNOSIS — F41.9 ANXIETY: ICD-10-CM

## 2024-01-29 DIAGNOSIS — F51.04 PSYCHOPHYSIOLOGICAL INSOMNIA: ICD-10-CM

## 2024-01-29 RX ORDER — TRAZODONE HYDROCHLORIDE 100 MG/1
TABLET ORAL
Qty: 30 TABLET | Refills: 0 | Status: SHIPPED | OUTPATIENT
Start: 2024-01-29

## 2024-01-29 RX ORDER — DULOXETIN HYDROCHLORIDE 60 MG/1
CAPSULE, DELAYED RELEASE ORAL
Qty: 30 CAPSULE | Refills: 3 | Status: SHIPPED | OUTPATIENT
Start: 2024-01-29

## 2024-01-29 NOTE — TELEPHONE ENCOUNTER
Refill Request       Return in about 6 months (around 10/13/2023) for chronic care.   No showed last appt     Last Seen: Last Seen Department: 4/13/2023  Last Seen by PCP: 4/13/2023    Last Written: 12/22/23 qty 30 w/ 0 (trazodone)     08/21/23 qty 30 w/ 3 (duloxetine)     Next Appointment:   Future Appointments   Date Time Provider Department Center   2/5/2024  1:20 PM Damaris Helms DO [facility] AND [facility]       Future appointment scheduled      Requested Prescriptions     Pending Prescriptions Disp Refills    traZODone (DESYREL) 100 MG tablet [Pharmacy Med Name: traZODone 100 MG TABLET] 30 tablet 0     Sig: TAKE 1 TABLET BY MOUTH DAILY AS NEEDED FOR SLEEP    DULoxetine (CYMBALTA) 60 MG extended release capsule [Pharmacy Med Name: DULoxetine HCL DR 60 MG CAPSULE] 30 capsule 3     Sig: TAKE ONE CAPSULE BY MOUTH EVERY MORNING

## 2024-02-05 ENCOUNTER — OFFICE VISIT (OUTPATIENT)
Dept: PRIMARY CARE CLINIC | Age: 34
End: 2024-02-05

## 2024-02-05 VITALS
DIASTOLIC BLOOD PRESSURE: 80 MMHG | SYSTOLIC BLOOD PRESSURE: 114 MMHG | TEMPERATURE: 98.2 F | BODY MASS INDEX: 41.47 KG/M2 | HEART RATE: 89 BPM | WEIGHT: 289 LBS | RESPIRATION RATE: 16 BRPM

## 2024-02-05 DIAGNOSIS — F33.1 MODERATE EPISODE OF RECURRENT MAJOR DEPRESSIVE DISORDER (HCC): Primary | ICD-10-CM

## 2024-02-05 DIAGNOSIS — Z3A.28 28 WEEKS GESTATION OF PREGNANCY: ICD-10-CM

## 2024-02-05 DIAGNOSIS — F41.9 ANXIETY: ICD-10-CM

## 2024-02-05 DIAGNOSIS — F51.04 PSYCHOPHYSIOLOGICAL INSOMNIA: ICD-10-CM

## 2024-02-05 PROCEDURE — 99213 OFFICE O/P EST LOW 20 MIN: CPT

## 2024-02-05 SDOH — ECONOMIC STABILITY: INCOME INSECURITY: HOW HARD IS IT FOR YOU TO PAY FOR THE VERY BASICS LIKE FOOD, HOUSING, MEDICAL CARE, AND HEATING?: NOT HARD AT ALL

## 2024-02-05 SDOH — ECONOMIC STABILITY: FOOD INSECURITY: WITHIN THE PAST 12 MONTHS, THE FOOD YOU BOUGHT JUST DIDN'T LAST AND YOU DIDN'T HAVE MONEY TO GET MORE.: NEVER TRUE

## 2024-02-05 SDOH — ECONOMIC STABILITY: FOOD INSECURITY: WITHIN THE PAST 12 MONTHS, YOU WORRIED THAT YOUR FOOD WOULD RUN OUT BEFORE YOU GOT MONEY TO BUY MORE.: NEVER TRUE

## 2024-02-05 SDOH — ECONOMIC STABILITY: HOUSING INSECURITY
IN THE LAST 12 MONTHS, WAS THERE A TIME WHEN YOU DID NOT HAVE A STEADY PLACE TO SLEEP OR SLEPT IN A SHELTER (INCLUDING NOW)?: NO

## 2024-02-05 ASSESSMENT — PATIENT HEALTH QUESTIONNAIRE - PHQ9
6. FEELING BAD ABOUT YOURSELF - OR THAT YOU ARE A FAILURE OR HAVE LET YOURSELF OR YOUR FAMILY DOWN: 1
4. FEELING TIRED OR HAVING LITTLE ENERGY: 3
SUM OF ALL RESPONSES TO PHQ QUESTIONS 1-9: 11
SUM OF ALL RESPONSES TO PHQ9 QUESTIONS 1 & 2: 2
SUM OF ALL RESPONSES TO PHQ QUESTIONS 1-9: 11
10. IF YOU CHECKED OFF ANY PROBLEMS, HOW DIFFICULT HAVE THESE PROBLEMS MADE IT FOR YOU TO DO YOUR WORK, TAKE CARE OF THINGS AT HOME, OR GET ALONG WITH OTHER PEOPLE: 1
1. LITTLE INTEREST OR PLEASURE IN DOING THINGS: 1
8. MOVING OR SPEAKING SO SLOWLY THAT OTHER PEOPLE COULD HAVE NOTICED. OR THE OPPOSITE, BEING SO FIGETY OR RESTLESS THAT YOU HAVE BEEN MOVING AROUND A LOT MORE THAN USUAL: 0
2. FEELING DOWN, DEPRESSED OR HOPELESS: 1
7. TROUBLE CONCENTRATING ON THINGS, SUCH AS READING THE NEWSPAPER OR WATCHING TELEVISION: 1
9. THOUGHTS THAT YOU WOULD BE BETTER OFF DEAD, OR OF HURTING YOURSELF: 0
3. TROUBLE FALLING OR STAYING ASLEEP: 3
5. POOR APPETITE OR OVEREATING: 1

## 2024-02-05 NOTE — PROGRESS NOTES
PROGRESS NOTE   [facility]                                  [address], [address], [place] [address]         Phone: [phone]    Date of Service:  2/5/2024     Patient ID: .Chanel Mcneil is a 33 y.o. female      Subjective:     CC: mood/med check     HPI  Chanel Mcneil is a 33 y.o. female who presents for care of the above. Currently taking cymbalta for anxiety/depression and trazodone for insomnia. She is 28 wks pregnant with a boy, due in early May.  Following with [facility].  Continues to take Cymbalta with okay from OB.  Feels like mood is okay overall but does have a lot going on in life right now between pregnancy, selling house, moving, part-time work at [employer]anning [employer].  Does note low motivation as main manifestation of depression right now but finds it manageable.  No SI.  Has stayed on trazodone in pregnancy with okay from OB, sometimes taking 200 mg at night. Usually only getting 4-6 hrs of broken sleep per night. No trouble falling asleep but not staying asleep well.  Aware of limitations in treatment options during pregnancy.    ROS:  ROS negative except for pertinent positives noted in HPI.      Vitals:    02/05/24 1326   BP: 114/80   Site: Left Upper Arm   Position: Sitting   Cuff Size: Large Adult   Pulse: 89   Resp: 16   Temp: 98.2 °F (36.8 °C)   TempSrc: Oral   Weight: 131.1 kg (289 lb)       Allergies:  Latex and Penicillins    Outpatient Medications Marked as Taking for the 2/5/24 encounter (Office Visit) with Damaris Helms, DO   Medication Sig Dispense Refill    traZODone (DESYREL) 100 MG tablet TAKE 1 TABLET BY MOUTH DAILY AS NEEDED FOR SLEEP 30 tablet 0    DULoxetine (CYMBALTA) 60 MG extended release capsule TAKE ONE CAPSULE BY MOUTH EVERY MORNING 30 capsule 3         Objective:   Constitutional:   Reviewed vitals above  Well Nourished, well developed, no distress       HENT:  Normal

## 2024-02-21 DIAGNOSIS — F51.04 PSYCHOPHYSIOLOGICAL INSOMNIA: ICD-10-CM

## 2024-02-21 NOTE — TELEPHONE ENCOUNTER
Refill Request       Last Seen: Last Seen Department: 2/5/2024  Last Seen by PCP: 2/5/2024    Last Written: 01/29/24 qty 30 w/ 3    Next Appointment:   Future Appointments   Date Time Provider Department Center   5/23/2024 10:40 AM Damaris Helms DO [facility]       Future appointment scheduled      traZODone (DESYREL) 100 MG tablet [[id number]]    Order Details  Dose, Route, Frequency: As Directed   Dispense Quantity: 30 tablet Refills: 0          Sig: TAKE 1 TABLET BY MOUTH DAILY AS NEEDED FOR SLEEP         Start Date: 01/29/24 End Date: --   Written Date: 01/29/24 Expiration Date: 01/28/25       Associated Diagnoses: Psychophysiological insomnia [F51.04]   Original Order: traZODone (DESYREL) 100 MG tablet [[id number]]

## 2024-02-22 RX ORDER — TRAZODONE HYDROCHLORIDE 100 MG/1
TABLET ORAL
Qty: 30 TABLET | Refills: 2 | Status: SHIPPED | OUTPATIENT
Start: 2024-02-22

## 2024-04-23 DIAGNOSIS — F51.04 PSYCHOPHYSIOLOGICAL INSOMNIA: ICD-10-CM

## 2024-04-23 DIAGNOSIS — F41.9 ANXIETY: ICD-10-CM

## 2024-04-23 RX ORDER — TRAZODONE HYDROCHLORIDE 100 MG/1
TABLET ORAL
Qty: 30 TABLET | Refills: 2 | Status: SHIPPED | OUTPATIENT
Start: 2024-04-23

## 2024-04-23 RX ORDER — DULOXETIN HYDROCHLORIDE 60 MG/1
60 CAPSULE, DELAYED RELEASE ORAL EVERY MORNING
Qty: 30 CAPSULE | Refills: 3 | Status: SHIPPED | OUTPATIENT
Start: 2024-04-23

## 2024-04-23 NOTE — TELEPHONE ENCOUNTER
Patient is calling requesting a refill of traZODone (DESYREL) 100 MG tablet and  DULoxetine (CYMBALTA) 60 MG extended release capsule please send to [facility]  patients last OV 02.05.24 and next OV 05.23.24.   please advise patient once medication is sent

## 2024-04-23 NOTE — TELEPHONE ENCOUNTER
Refill Request       Last Seen: Last Seen Department: 2/5/2024  Last Seen by PCP: 2/5/2024    Last Written: DULoxetine (CYMBALTA) 60 MG extended release capsule -1/29/24 30 with 3    traZODone (DESYREL) 100 MG tablet   2/22/24 30 with 2  Next Appointment:   Future Appointments   Date Time Provider Department Center   5/23/2024 10:40 AM Damaris Helms DO [facility]             Requested Prescriptions     Pending Prescriptions Disp Refills    DULoxetine (CYMBALTA) 60 MG extended release capsule 30 capsule 3     Sig: Take 1 capsule by mouth every morning    traZODone (DESYREL) 100 MG tablet 30 tablet 2     Sig: TAKE 1 TABLET BY MOUTH DAILY AS NEEDED FOR SLEEP

## 2024-05-31 ENCOUNTER — OFFICE VISIT (OUTPATIENT)
Dept: PRIMARY CARE CLINIC | Age: 34
End: 2024-05-31

## 2024-05-31 VITALS
SYSTOLIC BLOOD PRESSURE: 112 MMHG | HEART RATE: 72 BPM | BODY MASS INDEX: 38.88 KG/M2 | RESPIRATION RATE: 15 BRPM | WEIGHT: 271.6 LBS | DIASTOLIC BLOOD PRESSURE: 80 MMHG | TEMPERATURE: 98.1 F | HEIGHT: 70 IN

## 2024-05-31 DIAGNOSIS — F41.9 ANXIETY: ICD-10-CM

## 2024-05-31 DIAGNOSIS — F33.1 MODERATE EPISODE OF RECURRENT MAJOR DEPRESSIVE DISORDER (HCC): Primary | ICD-10-CM

## 2024-05-31 DIAGNOSIS — F51.04 PSYCHOPHYSIOLOGICAL INSOMNIA: ICD-10-CM

## 2024-05-31 PROCEDURE — 99213 OFFICE O/P EST LOW 20 MIN: CPT

## 2024-05-31 NOTE — PROGRESS NOTES
PROGRESS NOTE   [facility]                                  [address], [address], [facility] [address]         Phone: [phone]    Date of Service:  5/31/2024     Patient ID: .Chanel Mcneil is a 33 y.o. female      Subjective:     CC: postpartum mood/med check     HPI  Chanel Mcneil is a 33 y.o. female who presents for care of the above. Currently taking cymbalta for anxiety/depression and trazodone for insomnia, which she took throughout duration of her pregnancy. She is 4 wks postpartum primary LTCS, bottle-feeding. Reports she is doing very well! Mood is good, no baby blues or postpartum depression. Mahesh has been a pretty easy baby and is starting to sleep for longer stretches at night, 11-5. She had stopped taking trazodone after his birth since she was getting up frequently at night but took first dose again earlier this week, slept well and was able to wake easily when Mahesh did.    ROS:  ROS negative except for pertinent positives noted in HPI.      Vitals:    05/31/24 1452   BP: 112/80   Site: Left Upper Arm   Position: Sitting   Cuff Size: Small Adult   Pulse: 72   Resp: 15   Temp: 98.1 °F (36.7 °C)   TempSrc: Oral   Weight: 123.2 kg (271 lb 9.6 oz)   Height: 1.778 m (5' 10\")         Allergies:  Latex and Penicillins    Outpatient Medications Marked as Taking for the 5/31/24 encounter (Office Visit) with Damaris Helms, DO   Medication Sig Dispense Refill    DULoxetine (CYMBALTA) 60 MG extended release capsule Take 1 capsule by mouth every morning 30 capsule 3    traZODone (DESYREL) 100 MG tablet TAKE 1 TABLET BY MOUTH DAILY AS NEEDED FOR SLEEP 30 tablet 2         Objective:   Constitutional:   Reviewed vitals above  Well Nourished, well developed, no distress       HENT:  Normal external nose without lesions  Neck:  Symmetric and without masses  Resp:  Normal effort  Clear to auscultation bilaterally without

## 2024-08-20 ENCOUNTER — OFFICE VISIT (OUTPATIENT)
Dept: PRIMARY CARE CLINIC | Age: 34
End: 2024-08-20
Payer: COMMERCIAL

## 2024-08-20 VITALS
TEMPERATURE: 97.8 F | OXYGEN SATURATION: 100 % | SYSTOLIC BLOOD PRESSURE: 120 MMHG | BODY MASS INDEX: 40.29 KG/M2 | HEART RATE: 64 BPM | DIASTOLIC BLOOD PRESSURE: 78 MMHG | WEIGHT: 280.8 LBS

## 2024-08-20 DIAGNOSIS — F41.9 ANXIETY: ICD-10-CM

## 2024-08-20 DIAGNOSIS — G89.29 CHRONIC RIGHT-SIDED LOW BACK PAIN, UNSPECIFIED WHETHER SCIATICA PRESENT: Primary | ICD-10-CM

## 2024-08-20 DIAGNOSIS — F51.04 PSYCHOPHYSIOLOGICAL INSOMNIA: ICD-10-CM

## 2024-08-20 DIAGNOSIS — M54.50 CHRONIC RIGHT-SIDED LOW BACK PAIN, UNSPECIFIED WHETHER SCIATICA PRESENT: Primary | ICD-10-CM

## 2024-08-20 PROCEDURE — 99214 OFFICE O/P EST MOD 30 MIN: CPT

## 2024-08-20 RX ORDER — METHOCARBAMOL 750 MG/1
750 TABLET, FILM COATED ORAL 4 TIMES DAILY
Qty: 40 TABLET | Refills: 0 | Status: SHIPPED | OUTPATIENT
Start: 2024-08-20 | End: 2024-08-30

## 2024-08-20 RX ORDER — DULOXETIN HYDROCHLORIDE 60 MG/1
60 CAPSULE, DELAYED RELEASE ORAL EVERY MORNING
Qty: 30 CAPSULE | Refills: 3 | Status: CANCELLED | OUTPATIENT
Start: 2024-08-20

## 2024-08-20 RX ORDER — DULOXETIN HYDROCHLORIDE 60 MG/1
120 CAPSULE, DELAYED RELEASE ORAL EVERY MORNING
Qty: 60 CAPSULE | Refills: 1 | Status: SHIPPED | OUTPATIENT
Start: 2024-08-20

## 2024-08-20 RX ORDER — TRAZODONE HYDROCHLORIDE 100 MG/1
TABLET ORAL
Qty: 30 TABLET | Refills: 2 | Status: CANCELLED | OUTPATIENT
Start: 2024-08-20

## 2024-08-20 RX ORDER — TRAZODONE HYDROCHLORIDE 100 MG/1
TABLET ORAL
Qty: 30 TABLET | Refills: 2 | Status: SHIPPED | OUTPATIENT
Start: 2024-08-20

## 2024-08-20 ASSESSMENT — ENCOUNTER SYMPTOMS
DIARRHEA: 0
NAUSEA: 0
CONSTIPATION: 0
VOMITING: 0
COUGH: 0
SHORTNESS OF BREATH: 0
WHEEZING: 0

## 2024-08-20 NOTE — PROGRESS NOTES
tablet TAKE 1 TABLET BY MOUTH DAILY AS NEEDED FOR SLEEP 30 tablet 2    methocarbamol (ROBAXIN-750) 750 MG tablet Take 1 tablet by mouth 4 times daily for 10 days 40 tablet 0         Objective:   Physical Exam  Constitutional:       Appearance: Normal appearance.   HENT:      Head: Normocephalic.   Eyes:      Conjunctiva/sclera: Conjunctivae normal.   Cardiovascular:      Rate and Rhythm: Normal rate and regular rhythm.      Pulses: Normal pulses.      Heart sounds: Normal heart sounds.   Pulmonary:      Effort: Pulmonary effort is normal.      Breath sounds: Normal breath sounds.   Abdominal:      General: Abdomen is flat. Bowel sounds are normal.      Palpations: Abdomen is soft.   Skin:     General: Skin is warm.   Neurological:      General: No focal deficit present.      Mental Status: She is alert and oriented to person, place, and time.         Assessment / Plan:     1. 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 Anxiety  Assessment & Plan:  Acute exacerbation of concerns  Possibly secondary to recent pregnancy  Will trial increasing Cymbalta to 120 mg daily  Will follow-up with patient in approximately 2 to 3 weeks to reassess, if patient not having significant improvement of symptoms, can

## 2024-08-21 LAB
BACTERIA URNS QL MICRO: NORMAL /HPF
BILIRUB UR QL STRIP.AUTO: NEGATIVE
CLARITY UR: CLEAR
COLOR UR: YELLOW
EPI CELLS #/AREA URNS AUTO: 1 /HPF (ref 0–5)
GLUCOSE UR STRIP.AUTO-MCNC: NEGATIVE MG/DL
HGB UR QL STRIP.AUTO: NEGATIVE
HYALINE CASTS #/AREA URNS AUTO: 0 /LPF (ref 0–8)
KETONES UR STRIP.AUTO-MCNC: NEGATIVE MG/DL
LEUKOCYTE ESTERASE UR QL STRIP.AUTO: NEGATIVE
NITRITE UR QL STRIP.AUTO: NEGATIVE
PH UR STRIP.AUTO: 6.5 [PH] (ref 5–8)
PROT UR STRIP.AUTO-MCNC: NEGATIVE MG/DL
RBC CLUMPS #/AREA URNS AUTO: 0 /HPF (ref 0–4)
SP GR UR STRIP.AUTO: 1.01 (ref 1–1.03)
UA DIPSTICK W REFLEX MICRO PNL UR: NORMAL
URN SPEC COLLECT METH UR: NORMAL
UROBILINOGEN UR STRIP-ACNC: 0.2 E.U./DL
WBC #/AREA URNS AUTO: 1 /HPF (ref 0–5)

## 2024-08-21 NOTE — ASSESSMENT & PLAN NOTE
Ongoing concern  Not well-managed with Cymbalta or trazodone  I am highly concerned about possible underlying sleep apnea inpatient due to history, will refer patient to sleep specialist for further evaluation and treatment  Extensive conversation was had with patient about the benefits of CPAP for management of sleep apnea

## 2024-08-21 NOTE — ASSESSMENT & PLAN NOTE
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

## 2024-08-21 NOTE — ASSESSMENT & PLAN NOTE
Acute exacerbation of concerns  Possibly secondary to recent pregnancy  Will trial increasing Cymbalta to 120 mg daily  Will follow-up with patient in approximately 2 to 3 weeks to reassess, if patient not having significant improvement of symptoms, can consider cross taper and eliminating Cymbalta for SSRI

## 2024-09-12 ENCOUNTER — HOSPITAL ENCOUNTER (OUTPATIENT)
Dept: PHYSICAL THERAPY | Age: 34
Setting detail: THERAPIES SERIES
Discharge: HOME | End: 2024-09-12
Payer: COMMERCIAL

## 2024-09-12 DIAGNOSIS — M62.89 MUSCLE TIGHTNESS: ICD-10-CM

## 2024-09-12 DIAGNOSIS — M53.3 SACRAL DYSFUNCTION: ICD-10-CM

## 2024-09-12 DIAGNOSIS — R53.1 WEAKNESS: Primary | ICD-10-CM

## 2024-09-12 PROCEDURE — 97161 PT EVAL LOW COMPLEX 20 MIN: CPT

## 2024-09-12 PROCEDURE — 97110 THERAPEUTIC EXERCISES: CPT

## 2024-09-12 PROCEDURE — 97140 MANUAL THERAPY 1/> REGIONS: CPT

## 2024-09-13 DIAGNOSIS — F41.9 ANXIETY: ICD-10-CM

## 2024-09-13 DIAGNOSIS — F51.04 PSYCHOPHYSIOLOGICAL INSOMNIA: ICD-10-CM

## 2024-09-16 RX ORDER — DULOXETIN HYDROCHLORIDE 60 MG/1
120 CAPSULE, DELAYED RELEASE ORAL EVERY MORNING
Qty: 60 CAPSULE | Refills: 1 | Status: SHIPPED | OUTPATIENT
Start: 2024-09-16

## 2024-09-16 RX ORDER — TRAZODONE HYDROCHLORIDE 100 MG/1
TABLET ORAL
Qty: 30 TABLET | Refills: 2 | Status: SHIPPED | OUTPATIENT
Start: 2024-09-16

## 2024-09-18 ENCOUNTER — APPOINTMENT (OUTPATIENT)
Dept: PHYSICAL THERAPY | Age: 34
End: 2024-09-18
Payer: COMMERCIAL

## 2024-09-19 DIAGNOSIS — G89.29 CHRONIC RIGHT-SIDED LOW BACK PAIN, UNSPECIFIED WHETHER SCIATICA PRESENT: ICD-10-CM

## 2024-09-19 DIAGNOSIS — M54.50 CHRONIC RIGHT-SIDED LOW BACK PAIN, UNSPECIFIED WHETHER SCIATICA PRESENT: ICD-10-CM

## 2024-09-19 RX ORDER — METHOCARBAMOL 750 MG/1
750 TABLET, FILM COATED ORAL 4 TIMES DAILY
Qty: 40 TABLET | Refills: 0 | Status: SHIPPED | OUTPATIENT
Start: 2024-09-19

## 2024-09-24 ENCOUNTER — APPOINTMENT (OUTPATIENT)
Dept: PHYSICAL THERAPY | Age: 34
End: 2024-09-24
Payer: COMMERCIAL

## 2024-09-27 ENCOUNTER — APPOINTMENT (OUTPATIENT)
Dept: PHYSICAL THERAPY | Age: 34
End: 2024-09-27
Payer: COMMERCIAL

## 2024-10-15 DIAGNOSIS — F41.9 ANXIETY: ICD-10-CM

## 2024-10-15 RX ORDER — DULOXETIN HYDROCHLORIDE 60 MG/1
120 CAPSULE, DELAYED RELEASE ORAL EVERY MORNING
Qty: 180 CAPSULE | Refills: 0 | Status: SHIPPED | OUTPATIENT
Start: 2024-10-15

## 2024-10-15 NOTE — TELEPHONE ENCOUNTER
I have sent in a 90 day supply of this medication.  Please call and inform patient.  Also please schedule patient for virtual visit, as I would like to see how her mood is on this medication at the new dosing.

## 2024-10-15 NOTE — TELEPHONE ENCOUNTER
Refill Request       Last Seen: Last Seen Department: 8/20/2024  Last Seen by PCP: 8/20/2024    Last Written: 9/16/2024    Next Appointment:   No future appointments.    Message to  to schedule appointment.         Requested Prescriptions     Pending Prescriptions Disp Refills    DULoxetine (CYMBALTA) 60 MG extended release capsule [Pharmacy Med Name: DULoxetine HCL DR 60 MG CAPSULE] 60 capsule 1     Sig: TAKE 2 CAPSULES BY MOUTH EVERY MORNING

## 2024-11-17 DIAGNOSIS — F51.04 PSYCHOPHYSIOLOGICAL INSOMNIA: ICD-10-CM

## 2024-11-18 RX ORDER — TRAZODONE HYDROCHLORIDE 100 MG/1
TABLET ORAL
Qty: 30 TABLET | Refills: 2 | Status: SHIPPED | OUTPATIENT
Start: 2024-11-18

## 2024-11-18 NOTE — TELEPHONE ENCOUNTER
Refill Request       Last Seen: Last Seen Department: 8/20/2024  Last Seen by PCP: 8/20/2024    Last Written: 9/16/24 30 with 2    Next Appointment:   No future appointments.    Requested Prescriptions     Pending Prescriptions Disp Refills    traZODone (DESYREL) 100 MG tablet [Pharmacy Med Name: traZODone 100 MG TABLET] 30 tablet 2     Sig: TAKE ONE TABLET BY MOUTH DAILY AS NEEDED FOR SLEEP

## 2025-01-16 DIAGNOSIS — F41.9 ANXIETY: ICD-10-CM

## 2025-01-16 RX ORDER — DULOXETIN HYDROCHLORIDE 60 MG/1
120 CAPSULE, DELAYED RELEASE ORAL EVERY MORNING
Qty: 180 CAPSULE | Refills: 0 | Status: SHIPPED | OUTPATIENT
Start: 2025-01-16

## 2025-01-16 NOTE — TELEPHONE ENCOUNTER
Refill Request       Last Seen: Last Seen Department: 8/20/2024  Last Seen by PCP: 8/20/2024    Last Written: 10/15/24 180 with 0    Next Appointment:   No future appointments.    Requested Prescriptions     Pending Prescriptions Disp Refills    DULoxetine (CYMBALTA) 60 MG extended release capsule [Pharmacy Med Name: DULoxetine HCL DR 60 MG CAPSULE] 180 capsule 0     Sig: TAKE 2 CAPSULES BY MOUTH EVERY MORNING

## 2025-02-17 DIAGNOSIS — F51.04 PSYCHOPHYSIOLOGICAL INSOMNIA: ICD-10-CM

## 2025-02-17 RX ORDER — TRAZODONE HYDROCHLORIDE 100 MG/1
TABLET ORAL
Qty: 30 TABLET | Refills: 2 | Status: SHIPPED | OUTPATIENT
Start: 2025-02-17

## 2025-02-17 NOTE — TELEPHONE ENCOUNTER
Refill Request       Last Seen: Last Seen Department: 8/20/2024  Last Seen by PCP: 8/20/2024    Last Written: 11/18/24 30 with 2    Next Appointment:   No future appointments.    Requested Prescriptions     Pending Prescriptions Disp Refills    traZODone (DESYREL) 100 MG tablet 30 tablet 2     Sig: TAKE ONE TABLET BY MOUTH DAILY AS NEEDED FOR SLEEP

## 2025-04-16 DIAGNOSIS — F41.9 ANXIETY: ICD-10-CM

## 2025-04-17 RX ORDER — DULOXETIN HYDROCHLORIDE 60 MG/1
120 CAPSULE, DELAYED RELEASE ORAL EVERY MORNING
Qty: 180 CAPSULE | Refills: 0 | Status: SHIPPED | OUTPATIENT
Start: 2025-04-17

## 2025-04-17 NOTE — TELEPHONE ENCOUNTER
Refill Request       Last Seen: Last Seen Department: 8/20/2024  Last Seen by PCP: 8/20/2024    Last Written: 1/16/25 180 0 refills     Next Appointment:   No future appointments.          Requested Prescriptions     Pending Prescriptions Disp Refills    DULoxetine (CYMBALTA) 60 MG extended release capsule [Pharmacy Med Name: DULoxetine HCL DR 60 MG CAPSULE] 180 capsule 0     Sig: TAKE 2 CAPSULES BY MOUTH EVERY MORNING

## 2025-05-30 DIAGNOSIS — F51.04 PSYCHOPHYSIOLOGICAL INSOMNIA: ICD-10-CM

## 2025-05-30 RX ORDER — TRAZODONE HYDROCHLORIDE 100 MG/1
TABLET ORAL
Qty: 30 TABLET | Refills: 2 | Status: SHIPPED | OUTPATIENT
Start: 2025-05-30

## 2025-05-30 NOTE — TELEPHONE ENCOUNTER
Refill Request       Last Seen: Last Seen Department: 8/20/2024  Last Seen by PCP: Visit date not found    Last Written: 2/17/25 30 with 2    Next Appointment:   No future appointments.      Requested Prescriptions     Pending Prescriptions Disp Refills    traZODone (DESYREL) 100 MG tablet [Pharmacy Med Name: traZODone 100 MG TABLET] 30 tablet 2     Sig: TAKE 1 TABLET BY MOUTH DAILY AS NEEDED FOR SLEEP